# Patient Record
Sex: FEMALE | Race: ASIAN | NOT HISPANIC OR LATINO | URBAN - METROPOLITAN AREA
[De-identification: names, ages, dates, MRNs, and addresses within clinical notes are randomized per-mention and may not be internally consistent; named-entity substitution may affect disease eponyms.]

---

## 2017-01-27 ENCOUNTER — DOCUMENTATION ONLY (OUTPATIENT)
Dept: FAMILY MEDICINE | Facility: CLINIC | Age: 22
End: 2017-01-27

## 2017-01-27 ENCOUNTER — MEDICAL CORRESPONDENCE (OUTPATIENT)
Dept: HEALTH INFORMATION MANAGEMENT | Facility: CLINIC | Age: 22
End: 2017-01-27

## 2017-01-27 NOTE — PROGRESS NOTES
Consulted with Dr. Mathews to assist with creating a treatment plan after Dr. Mathews assessed this patient. I recommended making a referral to [facility] for ongoing mental health treatment.  Faxed referral form over to [facility]. Because the wait for psychiatry is typically quite long, recommend considering a PCT consult with Dr. Weeks to assist with assessment and treatment planning. Agatha is scheduled for follow-up with Dr. Mathews on 1/30/17, and they can discuss this option further at that visit.    Brit Ballard, PhD,   Behavioral Health Fellow

## 2017-01-27 NOTE — PROGRESS NOTES
"Patient seen today during her daughter's 4mo C (daughter is Yelena Rosales  2016). Patient has concerns for postpartum depression with psychotic features.     Subjective from today's visit: Mom is feeling down and depressed, mainly with stress about taking care of 4 children. This has been going on since about 2 weeks postpartum, but she thought it would go away and therefore elected not to tell anyone. She denies feeling anxious, but sometimes hears voices that aren't there. The voices tell her that she is lazy and not working. She denies any command hallucinations. She states that has been seeing a face and sometimes a whole body, in the mornings when she is still in bed. She sees it out of the corner of her eye, and then it goes away when she looks directly at them. She has had thoughts of hurting her kids but states that she doesn't hurt them because she doesn't want to hurt them. She just sometimes wants to be alone. She denies any active plan to hurt herself or her kids or others. Her  works during the day. She denies having any other family or other support system in the area. She reports having depressed mood \"sometimes\" in the past but has never been on medications for depression or been in therapy, or had a formal diagnosis of depression. She would be willing to consider a med.     PHQ-9 of 10. Had her sign a form to help get her set up with [facility], and will give to Dr. Ballard. She has a f/u appt already scheduled with me on 17 and I strongly encouraged her to attend.     Bernadette Mathews MD  "

## 2017-01-28 ASSESSMENT — PATIENT HEALTH QUESTIONNAIRE - PHQ9: SUM OF ALL RESPONSES TO PHQ QUESTIONS 1-9: 10

## 2017-01-30 ENCOUNTER — DOCUMENTATION ONLY (OUTPATIENT)
Dept: FAMILY MEDICINE | Facility: CLINIC | Age: 22
End: 2017-01-30

## 2017-01-30 ENCOUNTER — OFFICE VISIT (OUTPATIENT)
Dept: FAMILY MEDICINE | Facility: CLINIC | Age: 22
End: 2017-01-30

## 2017-01-30 VITALS
WEIGHT: 98.8 LBS | SYSTOLIC BLOOD PRESSURE: 98 MMHG | HEART RATE: 90 BPM | TEMPERATURE: 98.5 F | BODY MASS INDEX: 20.65 KG/M2 | DIASTOLIC BLOOD PRESSURE: 66 MMHG

## 2017-01-30 RX ORDER — SERTRALINE HYDROCHLORIDE 25 MG/1
25 TABLET, FILM COATED ORAL DAILY
Qty: 30 TABLET | Refills: 0 | Status: SHIPPED | OUTPATIENT
Start: 2017-01-30 | End: 2017-05-25

## 2017-01-30 NOTE — MR AVS SNAPSHOT
After Visit Summary   1/30/2017    Agatha Rosales    MRN: [id number]           Patient Information     Date Of Birth          1995        Visit Information        Provider Department      1/30/2017 11:20 AM Bernadette Mathews MD [facility]        Today's Diagnoses     Postpartum depression    -  1       Care Instructions    Start Zoloft 1 tab daily    Return in 1 week for follow-up    If your thoughts about self harm increase or if you just need additional support and care, here are some resources for you:    Crisis Lines:    [facility] Adult Crisis:  [phone]  Crisis Connection:  [phone]  [facility] Multilingual Crisis Line:  [phone]    You can also consider going to the [facility] at the following address.  Walk ins are welcome:    [address] [place] [address]   [phone] (for 24 hour crisis consultation)    Monday - Friday 8:00am - 7:00pm  Saturday:  11:00am - 3:00pm  Sunday and Holidays Closed    If you feel at risk of immediate harm, go directly to the Emergency Department.          Follow-ups after your visit        Additional Services     MENTAL HEALTH REFERRAL       Use this form for in clinic and community psychiatry and behavioral health consults. The referral coordinator will help to determine whether patients are best served by clinic behavioral health staff or by community providers.    Reason for referral: 23yo female with likely postpartum depression, possible psychosis. Would like referral to pyschiatry for diagnostic clarification and med management recs. Could we have her see Dr. Weeks?    Please provide data for below screening tools if available.   PHQ-9 Score:   Bipolar Screen:   SHIVA & Score:  PC-PTSD Score:   MMSE:   Heath (ADHD):   MCHAT (Autism Screen):  Pediatric Symptom Checklist (PSC):   Other Screening measures used:     Type of referral requested (indicate all that apply):    Adult Psychiatry--for  diagnosis and medication management     needed: Yes  Language: Perri  ([place] Only) Referral should be tracked (Yes/No)?                  Future tests that were ordered for you today     Open Future Orders        Priority Expected Expires Ordered    MENTAL HEALTH REFERRAL Routine  2018            Who to contact     Please call your clinic at [phone] to:    Ask questions about your health    Make or cancel appointments    Discuss your medicines    Learn about your test results    Speak to your doctor   If you have compliments or concerns about an experience at your clinic, or if you wish to file a complaint, please contact [facility] Patient Relations at [phone] or email us at [email or web address]         Additional Information About Your Visit        The ADEX Information     The ADEX is an electronic gateway that provides easy, online access to your medical records. With The ADEX, you can request a clinic appointment, read your test results, renew a prescription or communicate with your care team.     To sign up for The ADEX visit the website at [email or web address]   You will be asked to enter the access code listed below, as well as some personal information. Please follow the directions to create your username and password.     Your access code is: [email or web address]  Expires: 3/8/2017  8:40 AM     Your access code will  in 90 days. If you need help or a new code, please contact your [facility] or call [phone] for assistance.        Care EveryWhere ID     This is your Care EveryWhere ID. This could be used by other organizations to access your [place] medical records  [id number]        Your Vitals Were     Pulse Temperature Last Period Breastfeeding?          90 98.5  F (36.9  C) (Oral) 2017 (Within Months) Yes         Blood Pressure from Last 3 Encounters:   17 98/66   16 100/62    12/08/16 103/66    Weight from Last 3 Encounters:   01/30/17 98 lb 12.8 oz (44.815 kg)   12/19/16 101 lb 12.8 oz (46.176 kg)   12/08/16 101 lb 6.4 oz (45.995 kg)                 Today's Medication Changes          These changes are accurate as of: 1/30/17 12:14 PM.  If you have any questions, ask your nurse or doctor.               Start taking these medicines.        Dose/Directions    sertraline 25 MG tablet   Commonly known as:  ZOLOFT   Used for:  Postpartum depression   Started by:  Bernadette Mathews MD        Dose:  25 mg   Take 1 tablet (25 mg) by mouth daily   Quantity:  30 tablet   Refills:  0            Where to get your medicines      These medications were sent to [facility] - [place], [place] - [address], [place] [address]     Phone:  [phone]    - sertraline 25 MG tablet             Primary Care Provider Office Phone # Fax #    Bernadette Mathews [phone]       [address] [address]        Thank you!     Thank you for choosing [facility]  for your care. Our goal is always to provide you with excellent care. Hearing back from our patients is one way we can continue to improve our services. Please take a few minutes to complete the written survey that you may receive in the mail after your visit with us. Thank you!             Your Updated Medication List - Protect others around you: Learn how to safely use, store and throw away your medicines at www.disposemymeds.org.          This list is accurate as of: 1/30/17 12:14 PM.  Always use your most recent med list.                   Brand Name Dispense Instructions for use    acetaminophen 325 MG tablet    TYLENOL    100 tablet    Take 1-2 tablets (325-650 mg) by mouth every 6 hours as needed for headaches       calcium carbonate 500 MG chewable tablet    TUMS    150 tablet    Take 1 tablet (500 mg) by mouth 3 times daily as needed for heartburn       etonogestrel  68 MG Impl    IMPLANON/NEXPLANON     1 each (68 mg) by Subdermal route continuous       hydrocortisone 1 % cream    CORTAID    30 g    Apply sparingly to affected area three times daily for 14 days.       ibuprofen 600 MG tablet    ADVIL/MOTRIN    30 tablet    Take 1 tablet (600 mg) by mouth every 6 hours as needed for moderate pain       lanolin ointment     28 g    Apply topically daily as needed for dry skin       prenatal multivitamin  plus iron 27-0.8 MG Tabs per tablet     100 tablet    Take 1 tablet by mouth daily       sertraline 25 MG tablet    ZOLOFT    30 tablet    Take 1 tablet (25 mg) by mouth daily

## 2017-01-30 NOTE — NURSING NOTE
name: Herminio  Language: Perri  Agency: [facility]  Phone number: [phone]

## 2017-01-30 NOTE — PROGRESS NOTES
Hello Team [place]  Please review chart notes to evaluate for in house therapy.  Thank you.  Kayla Shannon  1/30/17

## 2017-01-30 NOTE — PROGRESS NOTES
Preceptor attestation:  Patient seen and discussed with the resident.  Assessment and plan reviewed with resident and agreed upon.  Supervising physician: Ramesh Randall  [facility]
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
## 2017-01-30 NOTE — PATIENT INSTRUCTIONS
Start Zoloft 1 tab daily    Return in 1 week for follow-up    If your thoughts about self harm increase or if you just need additional support and care, here are some resources for you:    Crisis Lines:    [facility] Adult Crisis:  [phone]  Crisis Connection:  [phone]  [facility] Multilingual Crisis Line:  [phone]    You can also consider going to the [facility] at the following address.  Walk ins are welcome:    [address] [place] [address]   [phone] (for 24 hour crisis consultation)    Monday - Friday 8:00am - 7:00pm  Saturday:  11:00am - 3:00pm  Sunday and Holidays Closed    If you feel at risk of immediate harm, go directly to the Emergency Department.    MENTAL HEALTH REFERRAL  Message routed to Team [place] to evaluate for in house therapy.  Kayla Pack  1-30/17

## 2017-01-31 ASSESSMENT — PATIENT HEALTH QUESTIONNAIRE - PHQ9: SUM OF ALL RESPONSES TO PHQ QUESTIONS 1-9: 15

## 2017-01-31 NOTE — PROGRESS NOTES
I have reviewed and agree with the behavioral health fellow's documentation for this visit.  I did not personally see the patient.  See documentation only encounter with referral from 1/30/17 for additional detail on follow up.  Thanks!  Lily Storey, PhD., LP

## 2017-01-31 NOTE — PROGRESS NOTES
Looks like Dr. Ballard has already facilitated a referral to [facility] for therapy as of 1/27/17.  The first PCT slot with Dr. Weeks is not until April.  Will ask Dr. Mathews for input on setting up PCT visit with Dr. Weeks vs. Referral to community psychiatry.  Thanks!  Lily      Expand All Collapse All    Consulted with Dr. Mathews to assist with creating a treatment plan after Dr. Mathews assessed this patient. I recommended making a referral to [facility] for ongoing mental health treatment.  Faxed referral form over to [facility]. Because the wait for psychiatry is typically quite long, recommend considering a PCT consult with Dr. Weeks to assist with assessment and treatment planning. Agatha is scheduled for follow-up with Dr. Mathews on 1/30/17, and they can discuss this option further at that visit.    Brit Ballard, PhD,   Behavioral Health Fellow

## 2017-02-15 ENCOUNTER — OFFICE VISIT - HEALTHEAST (OUTPATIENT)
Dept: FAMILY MEDICINE | Facility: CLINIC | Age: 22
End: 2017-02-15

## 2017-02-15 DIAGNOSIS — Z28.39 IMMUNIZATION DEFICIENCY: ICD-10-CM

## 2017-02-15 ASSESSMENT — MIFFLIN-ST. JEOR: SCORE: 1073.25

## 2017-04-19 ENCOUNTER — MEDICAL CORRESPONDENCE (OUTPATIENT)
Dept: HEALTH INFORMATION MANAGEMENT | Facility: CLINIC | Age: 22
End: 2017-04-19

## 2017-04-21 ENCOUNTER — OFFICE VISIT (OUTPATIENT)
Dept: FAMILY MEDICINE | Facility: CLINIC | Age: 22
End: 2017-04-21

## 2017-04-21 VITALS
HEART RATE: 81 BPM | OXYGEN SATURATION: 99 % | SYSTOLIC BLOOD PRESSURE: 101 MMHG | TEMPERATURE: 98.2 F | DIASTOLIC BLOOD PRESSURE: 70 MMHG

## 2017-04-21 DIAGNOSIS — G47.00 PERSISTENT INSOMNIA: ICD-10-CM

## 2017-04-21 DIAGNOSIS — Z60.3 LANGUAGE BARRIER, CULTURAL DIFFERENCES: ICD-10-CM

## 2017-04-21 RX ORDER — HYDROXYZINE PAMOATE 25 MG/1
25 CAPSULE ORAL
Qty: 20 CAPSULE | Refills: 0 | Status: SHIPPED | OUTPATIENT
Start: 2017-04-21 | End: 2017-05-25

## 2017-04-21 SDOH — SOCIAL STABILITY - SOCIAL INSECURITY: ACCULTURATION DIFFICULTY: Z60.3

## 2017-04-21 NOTE — MR AVS SNAPSHOT
After Visit Summary   2017    Agatha Rosales    MRN: [id number]           Patient Information     Date Of Birth          1995        Visit Information        Provider Department      2017 4:30 PM Benji Ross MD [facility]        Today's Diagnoses     Postpartum depression    -  1    Language barrier, cultural differences        Persistent insomnia           Follow-ups after your visit        Who to contact     Please call your clinic at [phone] to:    Ask questions about your health    Make or cancel appointments    Discuss your medicines    Learn about your test results    Speak to your doctor   If you have compliments or concerns about an experience at your clinic, or if you wish to file a complaint, please contact [facility] Patient Relations at [phone] or email us at [email or web address]         Additional Information About Your Visit        MyChart Information     [employer] is an electronic gateway that provides easy, online access to your medical records. With [employer], you can request a clinic appointment, read your test results, renew a prescription or communicate with your care team.     To sign up for Celtrot visit the website at [email or web address]   You will be asked to enter the access code listed below, as well as some personal information. Please follow the directions to create your username and password.     Your access code is: [email or web address]  Expires: 2017  5:08 PM     Your access code will  in 90 days. If you need help or a new code, please contact your [facility] or call [phone] for assistance.        Care EveryWhere ID     This is your Care EveryWhere ID. This could be used by other organizations to access your [place] medical records  [id number]        Your Vitals Were     Pulse Temperature Pulse Oximetry             81 98.2  F (36.8  C) (Oral) 99%           Blood Pressure from Last 3 Encounters:   04/21/17 101/70   01/30/17 98/66   12/19/16 100/62    Weight from Last 3 Encounters:   01/30/17 98 lb 12.8 oz (44.8 kg)   12/19/16 101 lb 12.8 oz (46.2 kg)   12/08/16 101 lb 6.4 oz (46 kg)              Today, you had the following     No orders found for display         Today's Medication Changes          These changes are accurate as of: 4/21/17  5:08 PM.  If you have any questions, ask your nurse or doctor.               Start taking these medicines.        Dose/Directions    hydrOXYzine 25 MG capsule   Commonly known as:  VISTARIL   Used for:  Persistent insomnia   Started by:  Benji Ross MD        Dose:  25 mg   Take 1 capsule (25 mg) by mouth nightly as needed for itching   Quantity:  20 capsule   Refills:  0            Where to get your medicines      These medications were sent to [facility] - [place], [place] - [address], [place] [address]     Phone:  [phone]     hydrOXYzine 25 MG capsule                Primary Care Provider Office Phone # Fax #    Bernadette Mathews [phone]       [address] [address]        Thank you!     Thank you for choosing [facility]  for your care. Our goal is always to provide you with excellent care. Hearing back from our patients is one way we can continue to improve our services. Please take a few minutes to complete the written survey that you may receive in the mail after your visit with us. Thank you!             Your Updated Medication List - Protect others around you: Learn how to safely use, store and throw away your medicines at www.disposemymeds.org.          This list is accurate as of: 4/21/17  5:08 PM.  Always use your most recent med list.                   Brand Name Dispense Instructions for use    acetaminophen 325 MG tablet    TYLENOL    100 tablet    Take 1-2 tablets (325-650 mg) by mouth every 6 hours as needed for headaches        calcium carbonate 500 MG chewable tablet    TUMS    150 tablet    Take 1 tablet (500 mg) by mouth 3 times daily as needed for heartburn       etonogestrel 68 MG Impl    IMPLANON/NEXPLANON     1 each (68 mg) by Subdermal route continuous       hydrocortisone 1 % cream    CORTAID    30 g    Apply sparingly to affected area three times daily for 14 days.       hydrOXYzine 25 MG capsule    VISTARIL    20 capsule    Take 1 capsule (25 mg) by mouth nightly as needed for itching       ibuprofen 600 MG tablet    ADVIL/MOTRIN    30 tablet    Take 1 tablet (600 mg) by mouth every 6 hours as needed for moderate pain       lanolin ointment     28 g    Apply topically daily as needed for dry skin       prenatal multivitamin  plus iron 27-0.8 MG Tabs per tablet     100 tablet    Take 1 tablet by mouth daily       sertraline 25 MG tablet    ZOLOFT    30 tablet    Take 1 tablet (25 mg) by mouth daily

## 2017-04-22 ASSESSMENT — PATIENT HEALTH QUESTIONNAIRE - PHQ9: SUM OF ALL RESPONSES TO PHQ QUESTIONS 1-9: 20

## 2017-04-25 PROBLEM — Z63.8 HIGH LEVEL OF EXPRESSED EMOTION WITHIN FAMILY: Status: ACTIVE | Noted: 2017-04-25

## 2017-04-25 PROBLEM — F32.2 SEVERE MAJOR DEPRESSIVE DISORDER (H): Status: ACTIVE | Noted: 2017-04-25

## 2017-04-25 PROBLEM — Z91.49: Status: ACTIVE | Noted: 2017-04-25
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
## 2017-04-28 ENCOUNTER — OFFICE VISIT (OUTPATIENT)
Dept: FAMILY MEDICINE | Facility: CLINIC | Age: 22
End: 2017-04-28

## 2017-04-28 VITALS
TEMPERATURE: 98.3 F | DIASTOLIC BLOOD PRESSURE: 65 MMHG | SYSTOLIC BLOOD PRESSURE: 99 MMHG | WEIGHT: 96.25 LBS | OXYGEN SATURATION: 99 % | BODY MASS INDEX: 20.12 KG/M2 | HEART RATE: 84 BPM

## 2017-04-28 DIAGNOSIS — Z60.3 LANGUAGE BARRIER, CULTURAL DIFFERENCES: ICD-10-CM

## 2017-04-28 DIAGNOSIS — G47.00 PERSISTENT INSOMNIA: ICD-10-CM

## 2017-04-28 SDOH — SOCIAL STABILITY - SOCIAL INSECURITY: ACCULTURATION DIFFICULTY: Z60.3

## 2017-04-28 NOTE — MR AVS SNAPSHOT
After Visit Summary   2017    Agatha Rosales    MRN: [id number]           Patient Information     Date Of Birth          1995        Visit Information        Provider Department      2017 4:10 PM Benji Ross MD [facility]        Care Instructions    Ongoing Sleep difficulties.  Low appetite.  Low mood.    hydroxyzine not very helpful (25mg).   Has been itchy some.    1) Follow up at [place], as scheduled May 9th    2) Could still take 1-2 hydroxyzine at bedtime IF NEEDED.        Follow-ups after your visit        Who to contact     Please call your clinic at [phone] to:    Ask questions about your health    Make or cancel appointments    Discuss your medicines    Learn about your test results    Speak to your doctor   If you have compliments or concerns about an experience at your clinic, or if you wish to file a complaint, please contact [facility] Patient Relations at [phone] or email us at [email or web address]         Additional Information About Your Visit        MyChart Information     Lockstream is an electronic gateway that provides easy, online access to your medical records. With Lockstream, you can request a clinic appointment, read your test results, renew a prescription or communicate with your care team.     To sign up for Lockstream visit the website at [email or web address]   You will be asked to enter the access code listed below, as well as some personal information. Please follow the directions to create your username and password.     Your access code is: [email or web address]  Expires: 2017  5:08 PM     Your access code will  in 90 days. If you need help or a new code, please contact your [facility] or call [phone] for assistance.        Care EveryWhere ID     This is your Care EveryWhere ID. This could be used by other organizations to access your [facility]  records  [id number]        Your Vitals Were     Pulse Temperature Last Period Pulse Oximetry Breastfeeding? BMI (Body Mass Index)    84 98.3  F (36.8  C) (Oral) (LMP Unknown) 99% Yes 20.12 kg/m2       Blood Pressure from Last 3 Encounters:   04/28/17 99/65   04/21/17 101/70   01/30/17 98/66    Weight from Last 3 Encounters:   04/28/17 96 lb 4 oz (43.7 kg)   01/30/17 98 lb 12.8 oz (44.8 kg)   12/19/16 101 lb 12.8 oz (46.2 kg)              Today, you had the following     No orders found for display       Primary Care Provider Office Phone # Fax #    Bernadette Mathews [phone]       [address] [address]        Thank you!     Thank you for choosing [facility]  for your care. Our goal is always to provide you with excellent care. Hearing back from our patients is one way we can continue to improve our services. Please take a few minutes to complete the written survey that you may receive in the mail after your visit with us. Thank you!             Your Updated Medication List - Protect others around you: Learn how to safely use, store and throw away your medicines at www.disposemymeds.org.          This list is accurate as of: 4/28/17  4:43 PM.  Always use your most recent med list.                   Brand Name Dispense Instructions for use    acetaminophen 325 MG tablet    TYLENOL    100 tablet    Take 1-2 tablets (325-650 mg) by mouth every 6 hours as needed for headaches       etonogestrel 68 MG Impl    IMPLANON/NEXPLANON     1 each (68 mg) by Subdermal route continuous       hydrOXYzine 25 MG capsule    VISTARIL    20 capsule    Take 1 capsule (25 mg) by mouth nightly as needed for itching       sertraline 25 MG tablet    ZOLOFT    30 tablet    Take 1 tablet (25 mg) by mouth daily
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
## 2017-04-28 NOTE — PATIENT INSTRUCTIONS
Ongoing Sleep difficulties.  Low appetite.  Low mood.    hydroxyzine not very helpful (25mg).   Has been itchy some.    1) Follow up at [place], as scheduled May 9th    2) Could still take 1-2 hydroxyzine at bedtime IF NEEDED.

## 2017-04-28 NOTE — NURSING NOTE
name: Paresh Chavez  Language: Perri  Agency: [facility]  Phone number: [phone]

## 2017-04-29 ASSESSMENT — PATIENT HEALTH QUESTIONNAIRE - PHQ9: SUM OF ALL RESPONSES TO PHQ QUESTIONS 1-9: 17

## 2017-05-25 ENCOUNTER — OFFICE VISIT (OUTPATIENT)
Dept: FAMILY MEDICINE | Facility: CLINIC | Age: 22
End: 2017-05-25

## 2017-05-25 VITALS
OXYGEN SATURATION: 98 % | BODY MASS INDEX: 20.48 KG/M2 | WEIGHT: 98 LBS | SYSTOLIC BLOOD PRESSURE: 96 MMHG | TEMPERATURE: 98.1 F | HEART RATE: 82 BPM | DIASTOLIC BLOOD PRESSURE: 62 MMHG

## 2017-05-25 DIAGNOSIS — G47.00 PERSISTENT INSOMNIA: ICD-10-CM

## 2017-05-25 RX ORDER — SERTRALINE HYDROCHLORIDE 25 MG/1
TABLET, FILM COATED ORAL
Qty: 60 TABLET | Refills: 0 | Status: SHIPPED | OUTPATIENT
Start: 2017-05-25 | End: 2017-06-06

## 2017-05-25 RX ORDER — HYDROXYZINE PAMOATE 25 MG/1
25-50 CAPSULE ORAL
Qty: 60 CAPSULE | Refills: 0 | Status: SHIPPED | OUTPATIENT
Start: 2017-05-25 | End: 2017-06-20

## 2017-05-25 NOTE — PATIENT INSTRUCTIONS
- Crisis Connection: [phone] (crisis counseling)  - [facility] Suicide Hotline: 983.391.1707 (suicidal thoughts)  - [facility]: [phone] (psychiatric crisis, but can transport self)  - [facility]: [phone] (psychiatric crisis, but cannot transport self)  - [facility] Adult Crisis Line: [phone] (crisis counseling and walk-in urgent care mental health)  - [facility] Multilingual Crisis Line: [phone]  -Suicide Prevention Lifeline Phone: 2-135-842-NSYV (3410)  -If in immediate danger of harming self/others, call 9-1-1.
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
## 2017-05-25 NOTE — MR AVS SNAPSHOT
After Visit Summary   5/25/2017    Agatha Rosales    MRN: [id number]           Patient Information     Date Of Birth          1995        Visit Information        Provider Department      5/25/2017 2:50 PM Bernadette Mathews MD [facility]        Today's Diagnoses     Postpartum depression        Persistent insomnia          Care Instructions      - Crisis Connection: [phone] (crisis counseling)  - [facility] Suicide Hotline: 571.379.9888 (suicidal thoughts)  - [facility]: [phone] (psychiatric crisis, but can transport self)  - [facility]: [phone] (psychiatric crisis, but cannot transport self)  - [facility] Adult Crisis Line: [phone] (crisis counseling and walk-in urgent care mental health)  - [facility] Multilingual Crisis Line: [phone]  -Suicide Prevention Lifeline Phone: 5-686-435-TALK (2101)  -If in immediate danger of harming self/others, call 9-1-1.              Follow-ups after your visit        Who to contact     Please call your clinic at [phone] to:    Ask questions about your health    Make or cancel appointments    Discuss your medicines    Learn about your test results    Speak to your doctor   If you have compliments or concerns about an experience at your clinic, or if you wish to file a complaint, please contact [facility] Patient Relations at [phone] or email us at [email or web address]         Additional Information About Your Visit        MyChart Information     Haload is an electronic gateway that provides easy, online access to your medical records. With Haload, you can request a clinic appointment, read your test results, renew a prescription or communicate with your care team.     To sign up for DeliveryEdget visit the website at [email or web address]   You will be asked to enter the access code listed below, as well as some personal information. Please follow the directions to create your username  and password.     Your access code is: [email or web address]  Expires: 2017  5:08 PM     Your access code will  in 90 days. If you need help or a new code, please contact your [facility] or call [phone] for assistance.        Care EveryWhere ID     This is your Care EveryWhere ID. This could be used by other organizations to access your [place] medical records  [id number]        Your Vitals Were     Pulse Temperature Last Period Pulse Oximetry BMI (Body Mass Index)       82 98.1  F (36.7  C) (Oral) (LMP Unknown) 98% 20.48 kg/m2        Blood Pressure from Last 3 Encounters:   17 96/62   17 99/65   17 101/70    Weight from Last 3 Encounters:   17 98 lb (44.5 kg)   17 96 lb 4 oz (43.7 kg)   17 98 lb 12.8 oz (44.8 kg)              Today, you had the following     No orders found for display         Today's Medication Changes          These changes are accurate as of: 17  3:12 PM.  If you have any questions, ask your nurse or doctor.               These medicines have changed or have updated prescriptions.        Dose/Directions    hydrOXYzine 25 MG capsule   Commonly known as:  VISTARIL   This may have changed:    - how much to take  - reasons to take this   Used for:  Persistent insomnia   Changed by:  Bernadette Mathews MD        Dose:  25-50 mg   Take 1-2 capsules (25-50 mg) by mouth nightly as needed for other (sleep)   Quantity:  60 capsule   Refills:  0       sertraline 25 MG tablet   Commonly known as:  ZOLOFT   This may have changed:    - how much to take  - how to take this  - when to take this  - additional instructions   Used for:  Postpartum depression   Changed by:  Bernadette Mathews MD        Start with 1 tab (25 mg) daily for 1 week. Then increase to 2 tabs (50mg) daily.   Quantity:  60 tablet   Refills:  0            Where to get your medicines      These medications were sent to [facility] - [place], [place] - [facility] [address]  [address], [place] [address]     Phone:  [phone]     hydrOXYzine 25 MG capsule    sertraline 25 MG tablet                Primary Care Provider Office Phone # Fax #    Bernadette Mathews [phone]       [address] [address]        Thank you!     Thank you for choosing [facility]  for your care. Our goal is always to provide you with excellent care. Hearing back from our patients is one way we can continue to improve our services. Please take a few minutes to complete the written survey that you may receive in the mail after your visit with us. Thank you!             Your Updated Medication List - Protect others around you: Learn how to safely use, store and throw away your medicines at www.disposemymeds.org.          This list is accurate as of: 5/25/17  3:12 PM.  Always use your most recent med list.                   Brand Name Dispense Instructions for use    acetaminophen 325 MG tablet    TYLENOL    100 tablet    Take 1-2 tablets (325-650 mg) by mouth every 6 hours as needed for headaches       etonogestrel 68 MG Impl    IMPLANON/NEXPLANON     1 each (68 mg) by Subdermal route continuous       hydrOXYzine 25 MG capsule    VISTARIL    60 capsule    Take 1-2 capsules (25-50 mg) by mouth nightly as needed for other (sleep)       sertraline 25 MG tablet    ZOLOFT    60 tablet    Start with 1 tab (25 mg) daily for 1 week. Then increase to 2 tabs (50mg) daily.

## 2017-05-25 NOTE — PROGRESS NOTES
Preceptor attestation:  Patient seen and discussed with the resident. Assessment and plan reviewed with resident and agreed upon.  Supervising physician: Benji Ross  [facility]

## 2017-05-26 ASSESSMENT — PATIENT HEALTH QUESTIONNAIRE - PHQ9: SUM OF ALL RESPONSES TO PHQ QUESTIONS 1-9: 18

## 2017-06-06 ENCOUNTER — OFFICE VISIT (OUTPATIENT)
Dept: FAMILY MEDICINE | Facility: CLINIC | Age: 22
End: 2017-06-06

## 2017-06-06 ENCOUNTER — DOCUMENTATION ONLY (OUTPATIENT)
Dept: PSYCHOLOGY | Facility: CLINIC | Age: 22
End: 2017-06-06

## 2017-06-06 VITALS
SYSTOLIC BLOOD PRESSURE: 91 MMHG | HEART RATE: 79 BPM | DIASTOLIC BLOOD PRESSURE: 55 MMHG | BODY MASS INDEX: 20.57 KG/M2 | TEMPERATURE: 98.2 F | WEIGHT: 98.4 LBS

## 2017-06-06 DIAGNOSIS — Z23 NEED FOR VACCINATION: ICD-10-CM

## 2017-06-06 RX ORDER — SERTRALINE HYDROCHLORIDE 25 MG/1
75 TABLET, FILM COATED ORAL DAILY
Qty: 90 TABLET | Refills: 3 | Status: SHIPPED | OUTPATIENT
Start: 2017-06-06 | End: 2017-07-12

## 2017-06-06 NOTE — MR AVS SNAPSHOT
After Visit Summary   6/6/2017    Agatha Rosales    MRN: [id number]           Patient Information     Date Of Birth          1995        Visit Information        Provider Department      6/6/2017 10:20 AM Bernadette Mathews MD [facility]        Today's Diagnoses     Postpartum depression          Care Instructions    Take Zoloft 3 tabs (75mg) daily    Clinic will call to discuss therapy plan    Return in 2 weeks to follow-up on mood          Follow-ups after your visit        Additional Services     MENTAL HEALTH REFERRAL       Use this form for in clinic and community psychiatry and behavioral health consults. The referral coordinator will help to determine whether patients are best served by clinic behavioral health staff or by community providers.    Reason for referral: 21yo female with postpartum depression, interested in psychotherapy, would really prefer therapy here as it is difficult to get to [facility], understands she may have a long wait time    Please provide data for below screening tools if available.   PHQ-9 Score:   Bipolar Screen:   SHIVA & Score:  PC-PTSD Score:   MMSE:   [place] (ADHD):   MCHAT (Autism Screen):  Pediatric Symptom Checklist (PSC):   Other Screening measures used:     Type of referral requested (indicate all that apply):    Adult Psychotherapy--for diagnosis and non-pharmacological treatment     needed: Yes  Language: Perri  ([place] Only) Referral should be tracked (Yes/No)?                  Future tests that were ordered for you today     Open Future Orders        Priority Expected Expires Ordered    MENTAL HEALTH REFERRAL Routine  6/6/2018 6/6/2017            Who to contact     Please call your clinic at [phone] to:    Ask questions about your health    Make or cancel appointments    Discuss your medicines    Learn about your test results    Speak to your doctor   If you have compliments or concerns about an experience at your clinic, or if you  wish to file a complaint, please contact [facility] Patient Relations at [phone] or email us at [email or web address]         Additional Information About Your Visit        [employer] Information     [employer] is an electronic gateway that provides easy, online access to your medical records. With [employer], you can request a clinic appointment, read your test results, renew a prescription or communicate with your care team.     To sign up for [employer] visit the website at [email or web address]   You will be asked to enter the access code listed below, as well as some personal information. Please follow the directions to create your username and password.     Your access code is: [email or web address]  Expires: 2017  5:08 PM     Your access code will  in 90 days. If you need help or a new code, please contact your [facility] or call [phone] for assistance.        Care EveryWhere ID     This is your Care EveryWhere ID. This could be used by other organizations to access your [place] medical records  [id number]        Your Vitals Were     Pulse Temperature BMI (Body Mass Index)             79 98.2  F (36.8  C) (Oral) 20.57 kg/m2          Blood Pressure from Last 3 Encounters:   17 91/55   17 96/62   17 99/65    Weight from Last 3 Encounters:   17 98 lb 6.4 oz (44.6 kg)   17 98 lb (44.5 kg)   17 96 lb 4 oz (43.7 kg)                 Today's Medication Changes          These changes are accurate as of: 17 11:06 AM.  If you have any questions, ask your nurse or doctor.               These medicines have changed or have updated prescriptions.        Dose/Directions    sertraline 25 MG tablet   Commonly known as:  ZOLOFT   This may have changed:    - how much to take  - how to take this  - when to take this  - additional instructions   Used for:  Postpartum depression   Changed by:  Bernadette Mathews MD         Dose:  75 mg   Take 3 tablets (75 mg) by mouth daily   Quantity:  90 tablet   Refills:  3            Where to get your medicines      These medications were sent to [facility] - [place], [place] - [address], [place] [address]     Phone:  [phone]     sertraline 25 MG tablet                Primary Care Provider Office Phone # Fax #    Bernadette Mathews [phone]       [facility] [address] [address]        Thank you!     Thank you for choosing [facility]  for your care. Our goal is always to provide you with excellent care. Hearing back from our patients is one way we can continue to improve our services. Please take a few minutes to complete the written survey that you may receive in the mail after your visit with us. Thank you!             Your Updated Medication List - Protect others around you: Learn how to safely use, store and throw away your medicines at www.disposemymeds.org.          This list is accurate as of: 6/6/17 11:06 AM.  Always use your most recent med list.                   Brand Name Dispense Instructions for use    acetaminophen 325 MG tablet    TYLENOL    100 tablet    Take 1-2 tablets (325-650 mg) by mouth every 6 hours as needed for headaches       etonogestrel 68 MG Impl    IMPLANON/NEXPLANON     1 each (68 mg) by Subdermal route continuous       hydrOXYzine 25 MG capsule    VISTARIL    60 capsule    Take 1-2 capsules (25-50 mg) by mouth nightly as needed for other (sleep)       sertraline 25 MG tablet    ZOLOFT    90 tablet    Take 3 tablets (75 mg) by mouth daily

## 2017-06-06 NOTE — PATIENT INSTRUCTIONS
Take Zoloft 3 tabs (75mg) daily    Clinic will call to discuss therapy plan    Return in 2 weeks to follow-up on mood    Message has been sent to  team to advise.  Shruthi  06/06/17    See documentation encounter for details on mental health referral.    Dr. Ballard on 6/23/17.   An  has been requested as well as a ride scheduled to pick her up.   Shruthi  06/09/17

## 2017-06-06 NOTE — PROGRESS NOTES
Behavioral Health Team,    Patient is being referred for mental health services. Please advise if we are able to see patient for in house treatment or if a community option would be best.    Thank you.     Shruthi  Referral Coordinator

## 2017-06-06 NOTE — PROGRESS NOTES
Preceptor attestation:  Patient seen and discussed with the resident. Assessment and plan reviewed with resident and agreed upon.  Supervising physician: Joel Gupta  [facility]

## 2017-06-06 NOTE — PROGRESS NOTES
SUBJECTIVE       Agatha Rosales is a 22 year old  female with a PMH significant for:     Patient Active Problem List   Diagnosis     Screening for depression     Language barrier, cultural differences     Tension headache     History of Helicobacter pylori infection     Bilateral low back pain without sciatica     Dermatitis     Severe major depressive disorder (H)     Other personal history of psychological trauma, not elsewhere classified     High level of expressed emotion within family     She presents to follow-up on her depression. Was seen about 6 months postpartum after the delivery of her child, and diagnosed with postpartum depression. Was trialed on a low dose of Zoloft 25mg daily but patient felt it wasn't helping and self-stopped it. Was seen again in follow-up about 2 weeks ago. She was agreeable to restarting the Zoloft and titrating up the dose (initially on 25 mg daily, and now on 50mg daily). She reports she is taking this 2 tabs daily (50mg total). She also takes the hydroxyzine 25mg daily. She has found improvement in her mood and sleep since starting these meds.  She still notes some depressed mood intermittently. She has noticed no side effects from the medications and is tolerating them. She would like to increase the Zoloft to help her mood further. She also is interested in psychotherapy. Was seen at [facility] but she tells me transportation is difficult for her to go there. She would prefer psychotherapy here at clinic. PHQ-9 of 11 today, improved from 18 last visit. No SI.       PMH, Medications and Allergies were reviewed and updated as needed.        REVIEW OF SYSTEMS     Pertinent positives and negatives noted in HPI.         OBJECTIVE     Vitals:    06/06/17 1026   BP: 91/55   Pulse: 79   Temp: 98.2  F (36.8  C)   TempSrc: Oral   Weight: 98 lb 6.4 oz (44.6 kg)     Body mass index is 20.57 kg/(m^2).    General: Alert, NAD  Psych: Pleasant mood, flat affect, similar to previous  visits    No results found for this or any previous visit (from the past 24 hour(s)).        ASSESSMENT AND PLAN     Agatha was seen today for recheck.    Diagnoses and all orders for this visit:    Postpartum depression: Mood and sleep improved since restarting the Zoloft. Since noting improvement but still having some depressive symptoms, will increase the Zoloft to 75mg daily. Will see back in 2 weeks to make sure she is tolerating the dose. Will also place a new MH referral to see if it would be possible for her to do in-house psychotherapy, but I did warn pt that there may be a significant waiting period for this depending on scheduling.   -     sertraline (ZOLOFT) 25 MG tablet; Take 3 tablets (75 mg) by mouth daily  -     MENTAL HEALTH REFERRAL; Future    Need for vaccination: Pt agreeable to HPV vaccine #3 today.   -     ADMIN VACCINE, INITIAL  -     HPV9 (Gardasil 9 )          RTC in 2 weeks for follow up of depression or sooner if develops new or worsening symptoms.    Patient's plan of care was discussed with Dr. Gupta.    Bernadette Mathews MD PGY-3  Pager #: [phone]

## 2017-06-07 ASSESSMENT — PATIENT HEALTH QUESTIONNAIRE - PHQ9: SUM OF ALL RESPONSES TO PHQ QUESTIONS 1-9: 11

## 2017-06-09 NOTE — PROGRESS NOTES
Perfect, I have scheduled her with Dr. Ballard on 6/23/17.   An  has been requested as well as a ride scheduled to pick her up.     Shruthi  06/09/17

## 2017-06-09 NOTE — PROGRESS NOTES
Dr. 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  We can offer this patient an appt with Dr. Ballard, but should make her aware that Dr. Ballard will be with us only until early September.  If this is not agreeable to her, please provide the following community resources for her:    [facility]  [address]   [place], [place] [address]  [phone]    Lisy Arriaga PsyD, [phone]  Pregnancy & birth, postpartum & post-adoption issues  [facility], [address], [place] [address]    Jessica Casanova, PhD,  [phone]  Pregnancy & birth, adjustment to parenting, grief, trauma/EMDR, couples  [address] [place] [address]    Anika Elizabeth, PhD,  [phone]  Infertility, pregnancy & infant loss, postpartum, teaching/training  [facility], [address], [place] [address]    Merced Bailey, PhD, [phone]  Pregnancy & birth issues, postpartum, loss/trauma, mothering  [address], [place] [address]    Serene Viveros, PhD, , [phone]  Infertility, pregnancy, postpartum, grief & loss, trauma/EMDR, parenting, adoption  [address] [address]

## 2017-06-20 ENCOUNTER — OFFICE VISIT (OUTPATIENT)
Dept: FAMILY MEDICINE | Facility: CLINIC | Age: 22
End: 2017-06-20

## 2017-06-20 VITALS
WEIGHT: 95 LBS | HEART RATE: 86 BPM | DIASTOLIC BLOOD PRESSURE: 66 MMHG | BODY MASS INDEX: 19.94 KG/M2 | SYSTOLIC BLOOD PRESSURE: 100 MMHG | TEMPERATURE: 98.3 F | OXYGEN SATURATION: 100 % | HEIGHT: 58 IN

## 2017-06-20 DIAGNOSIS — G47.00 PERSISTENT INSOMNIA: ICD-10-CM

## 2017-06-20 DIAGNOSIS — R42 LIGHTHEADEDNESS: ICD-10-CM

## 2017-06-20 LAB
BUN SERPL-MCNC: 19.1 MG/DL (ref 7–19)
CALCIUM SERPL-MCNC: 9.6 MG/DL (ref 8.5–10.1)
CHLORIDE SERPLBLD-SCNC: 106.4 MMOL/L (ref 98–110)
CO2 SERPL-SCNC: 21.3 MMOL/L (ref 20–32)
CREAT SERPL-MCNC: 0.8 MG/DL (ref 0.5–1)
GFR SERPL CREATININE-BSD FRML MDRD: >90 ML/MIN/1.7 M2
GLUCOSE SERPL-MCNC: 98 MG'DL (ref 70–99)
HCT VFR BLD AUTO: 45.1 % (ref 35–47)
HEMOGLOBIN: 14.2 G/DL (ref 11.7–15.7)
MCH RBC QN AUTO: 28.5 PG (ref 26.5–35)
MCHC RBC AUTO-ENTMCNC: 31.5 G/DL (ref 32–36)
MCV RBC AUTO: 90.6 FL (ref 78–100)
PLATELET # BLD AUTO: 237 K/UL (ref 150–450)
POTASSIUM SERPL-SCNC: 3.8 MMOL/DL (ref 3.2–4.6)
RBC # BLD AUTO: 5 M/UL (ref 3.8–5.2)
SODIUM SERPL-SCNC: 140.3 MMOL/L (ref 132–142)
TSH SERPL DL<=0.05 MIU/L-ACNC: 1.65 UIU/ML (ref 0.3–5)
WBC # BLD AUTO: 8.4 K/UL (ref 4–11)

## 2017-06-20 RX ORDER — HYDROXYZINE PAMOATE 25 MG/1
25-50 CAPSULE ORAL
Qty: 60 CAPSULE | Refills: 3 | Status: SHIPPED | OUTPATIENT
Start: 2017-06-20 | End: 2017-08-25

## 2017-06-20 ASSESSMENT — PATIENT HEALTH QUESTIONNAIRE - PHQ9: 5. POOR APPETITE OR OVEREATING: NOT AT ALL

## 2017-06-20 ASSESSMENT — ANXIETY QUESTIONNAIRES
7. FEELING AFRAID AS IF SOMETHING AWFUL MIGHT HAPPEN: NOT AT ALL
2. NOT BEING ABLE TO STOP OR CONTROL WORRYING: NOT AT ALL
IF YOU CHECKED OFF ANY PROBLEMS ON THIS QUESTIONNAIRE, HOW DIFFICULT HAVE THESE PROBLEMS MADE IT FOR YOU TO DO YOUR WORK, TAKE CARE OF THINGS AT HOME, OR GET ALONG WITH OTHER PEOPLE: NOT DIFFICULT AT ALL
1. FEELING NERVOUS, ANXIOUS, OR ON EDGE: NOT AT ALL
3. WORRYING TOO MUCH ABOUT DIFFERENT THINGS: NOT AT ALL
6. BECOMING EASILY ANNOYED OR IRRITABLE: NOT AT ALL
GAD7 TOTAL SCORE: 0
5. BEING SO RESTLESS THAT IT IS HARD TO SIT STILL: NOT AT ALL

## 2017-06-20 NOTE — MR AVS SNAPSHOT
After Visit Summary   6/20/2017    Agatha Rosales    MRN: [id number]           Patient Information     Date Of Birth          1995        Visit Information        Provider Department      6/20/2017 10:00 AM Bernadette Mathews MD [facility]        Today's Diagnoses     Postpartum depression    -  1    Persistent insomnia        Lightheadedness           Follow-ups after your visit        Follow-up notes from your care team     Return in about 2 weeks (around 7/4/2017) for f/u mood.      Your next 10 appointments already scheduled     Jun 23, 2017  9:30 AM CDT   Return Visit with Brit Ballard PhD   [facility] ([facility])    [address] [place] [address]   [phone]            Jul 11, 2017  3:30 PM CDT   Return Visit with Prema Trotter MD   [facility] ([facility])    [address] [place] [address]   [phone]              Who to contact     Please call your clinic at [phone] to:    Ask questions about your health    Make or cancel appointments    Discuss your medicines    Learn about your test results    Speak to your doctor   If you have compliments or concerns about an experience at your clinic, or if you wish to file a complaint, please contact [facility] Patient Relations at [phone] or email us at [email or web address]         Additional Information About Your Visit        MyChart Information     [employer] is an electronic gateway that provides easy, online access to your medical records. With [employer], you can request a clinic appointment, read your test results, renew a prescription or communicate with your care team.     To sign up for [employer] visit the website at [email or web address]   You will be asked to enter the access code listed below, as well as some personal information. Please follow the directions to create your username and password.     Your access code is:  [email or web address]  Expires: 2017  5:08 PM     Your access code will  in 90 days. If you need help or a new code, please contact your [facility] or call [phone] for assistance.        Care EveryWhere ID     This is your Care EveryWhere ID. This could be used by other organizations to access your [place] medical records  [id number]        Your Vitals Were     Pulse Temperature Height Last Period Pulse Oximetry BMI (Body Mass Index)    86 98.3  F (36.8  C) 1.48 m (4' 10.27\") 2017 100% 19.67 kg/m2       Blood Pressure from Last 3 Encounters:   17 100/66   17 91/55   17 96/62    Weight from Last 3 Encounters:   17 43.1 kg (95 lb)   17 44.6 kg (98 lb 6.4 oz)   17 44.5 kg (98 lb)              We Performed the Following     Basic Metabolic Panel ([facility])  - Results < 1 hr     CBC with Plt ([facility])     TSH  [facility]ensitive ([facility])          Where to get your medicines      These medications were sent to [facility] - [place], [place] - [address], [place] [address]     Phone:  [phone]     hydrOXYzine 25 MG capsule          Primary Care Provider Office Phone # Fax #    Bernadette Mathews [phone]       [address] [address]        Thank you!     Thank you for choosing [facility]  for your care. Our goal is always to provide you with excellent care. Hearing back from our patients is one way we can continue to improve our services. Please take a few minutes to complete the written survey that you may receive in the mail after your visit with us. Thank you!             Your Updated Medication List - Protect others around you: Learn how to safely use, store and throw away your medicines at www.disposemymeds.org.          This list is accurate as of: 17 11:59 PM.  Always use your most recent med list.                   Brand Name Dispense Instructions " for use    acetaminophen 325 MG tablet    TYLENOL    100 tablet    Take 1-2 tablets (325-650 mg) by mouth every 6 hours as needed for headaches       etonogestrel 68 MG Impl    IMPLANON/NEXPLANON     1 each (68 mg) by Subdermal route continuous       hydrOXYzine 25 MG capsule    VISTARIL    60 capsule    Take 1-2 capsules (25-50 mg) by mouth nightly as needed for other (sleep)       sertraline 25 MG tablet    ZOLOFT    90 tablet    Take 3 tablets (75 mg) by mouth daily

## 2017-06-20 NOTE — PROGRESS NOTES
SUBJECTIVE       Agatha Rosales is a 22 year old  female with a PMH significant for:     Patient Active Problem List   Diagnosis     Screening for depression     Language barrier, cultural differences     Tension headache     History of Helicobacter pylori infection     Bilateral low back pain without sciatica     Dermatitis     Severe major depressive disorder (H)     Other personal history of psychological trauma, not elsewhere classified     High level of expressed emotion within family     Postpartum depression: At our last visit two weeks ago, Agatha had been tolerating her Zoloft well and was starting to feel better. We decided to increase her dose of Zoloft from 50 mg to 75 mg daily, although she has not yet made that change and has continued taking 50 mg. She had been doing well after that visit. However, on Saturday (3 days ago),  she had a headache that caused her to become anxious. She had some associated heart palpitations and difficulty breathing. Since that time, she has had increased anxiety, sadness, and fatigue as well as decreased appetite and difficulty falling asleep at night. She notes decreased interest in her daily activities, but still enjoys spending time with her children. She denies any suicidal ideation.  She no longer notes the headache. She endorses no recent trauma or trigger for the recently worsened symptoms. She feels safe at home and with her .     Additionally she reports dizziness and blurred vision after she stands from sitting. She has never fallen down or lost consciousness. She has not been eating or drinking much recently. She denies any chest pain, nausea, or vomiting.    PMH, Medications and Allergies were reviewed and updated as needed.        REVIEW OF SYSTEMS     Pertinent positives and negatives noted in HPI.        OBJECTIVE     Vitals:    06/20/17 1018   BP: 100/66   Pulse: 86   Temp: 98.3  F (36.8  C)   SpO2: 100%   Weight: 95 lb (43.1 kg)   Height:  "4' 10.27\" (148 cm)     Body mass index is 19.67 kg/(m^2).    Constitutional: Awake, alert, cooperative, no apparent distress.  Neck: Supple, symmetrical,  no adenopathy, thyroid symmetric, not enlarged and no tenderness or nodules, skin normal.  Lungs: Lungs clear to auscultation bilaterally, no crackles or wheezing.  Cardiovascular: Regular rate and rhythm, normal S1 and S2, no S3 or S4, and no murmur noted.  Psych: Flat affect, pleasant mood.     No results found for this or any previous visit (from the past 24 hour(s)).      ASSESSMENT AND PLAN     Postpartum depression  Recent worsening of her depressive symptoms as well as heightened anxiety, and what sounds like may have been a panic attack. Pt did not increase dose of Zoloft to 75 mg as discussed at last visit. We recommended she start taking 75 mg of Zoloft daily since she did initially note improvement on the lower dose, and counseled her on how to take her medications. We suggested she try taking her hydroxyzine one hour before bed to help with sleep. She can also take 1 or 2 tablets of hydroxyzine every 6 hrs prn during the day when she becomes anxious. We also encouraged her to meet with Dr. Ballard as scheduled on 6/23/17 for her first psychotherapy session. We will check her TSH today as her thyroid function has not been evaluated in the past, and if abnormal, this could contribute to depressive or anxiety symptoms. Will see back in 2 weeks to recheck mood and response to the increased Zoloft.     Lightheadedness  Could be related to reduced PO intake. Vital signs within normal limits.  Recommended she increase her fluid intake, and make sure she is getting proper nutrition with regular meals and snacks. For her symptoms, we will check CBC to rule out anemia, TSH to evaluate for thyroid abnormality, and a BMP to check a glucose and electrolytes.      RTC in 2 for follow up of depression or sooner if develops new or worsening symptoms.    Jessica DE LEON" Tammi Carpenter am acting as scribe for Dr. Bernadette Mathews MD.    The medical student acted as a scribe and the encounter documented above was performed completely by me and the documentation accurately reflects the work I have performed today.  Bernadette Mathews      Patient's plan of care was discussed with Dr. Arnett.    Bernadette Mathews MD PGY-3  Pager #: [phone]

## 2017-06-20 NOTE — NURSING NOTE
name: Paresh Chavez  Language: Perri  Agency: [facility]  Phone number: [phone]

## 2017-06-20 NOTE — PROGRESS NOTES
Preceptor attestation:  Patient seen and discussed with the resident. Assessment and plan reviewed with resident and agreed upon.  Supervising physician: Samuel Arnett MD  [facility]

## 2017-06-20 NOTE — LETTER
June 21, 2017      Agatha Rosales  [address] [place] [address] [address]  [place] [address]        Dear Agatha,    I have received your lab results from 6/20/17. The results were normal, including your blood counts, electrolytes like sodium and potassium, kidney function, and thyroid test. This is good news. We can discuss these results more at your upcoming follow-up visit in 2 weeks. Please feel free to call the clinic at [phone] if you have any questions or concerns.     Please see below for your test results.    Resulted Orders   CBC with Plt ([facility])   Result Value Ref Range    WBC 8.4 4.0 - 11.0 K/uL    RBC 5.0 3.8 - 5.2 M/uL    Hemoglobin 14.2 11.7 - 15.7 g/dL    Hematocrit 45.1 35.0 - 47.0 %    MCV 90.6 78.0 - 100.0 fL    MCH 28.5 26.5 - 35.0    MCHC 31.5 (L) 32.0 - 36.0 g/dL    Platelets 237.0 150.0 - 450.0 K/uL   Basic Metabolic Panel ([facility])  - Results < 1 hr   Result Value Ref Range    Urea Nitrogen 19.1 (H) 7.0 - 19.0 mg/dL    Calcium 9.6 8.5 - 10.1 mg/dL    Chloride 106.4 98.0 - 110.0 mmol/L    Carbon Dioxide 21.3 20.0 - 32.0 mmol/L    Creatinine 0.8 0.5 - 1.0 mg/dL    Glucose 98.0 70.0 - 99.0 mg'dL    Potassium 3.8 3.2 - 4.6 mmol/dL    Sodium 140.3 132.0 - 142.0 mmol/L    GFR Estimate >90 >60.0 mL/min/1.7 m2    GFR Estimate If Black >90 >60.0 mL/min/1.7 m2   TSH  Sensitive ([facility])   Result Value Ref Range    TSH 1.65 0.30 - 5.00 uIU/mL    Narrative    Test performed by:  [facility]  [address] [place] [address]       If you have any questions, please call the clinic to make an appointment.    Sincerely,    Bernadette Mathews MD

## 2017-06-21 ASSESSMENT — ASTHMA QUESTIONNAIRES: ACT_TOTALSCORE: 25

## 2017-06-21 ASSESSMENT — PATIENT HEALTH QUESTIONNAIRE - PHQ9: SUM OF ALL RESPONSES TO PHQ QUESTIONS 1-9: 14

## 2017-06-21 ASSESSMENT — ANXIETY QUESTIONNAIRES: GAD7 TOTAL SCORE: 0

## 2017-06-21 NOTE — PROGRESS NOTES
Please send patient the following letter:    Agatha,    I have received your lab results from 6/20/17. The results were normal, including your blood counts, electrolytes like sodium and potassium, kidney function, and thyroid test. This is good news. We can discuss these results more at your upcoming follow-up visit in 2 weeks. Please feel free to call the clinic at [phone] if you have any questions or concerns.     Sincerely,  Bernadette Mathews MD

## 2017-06-23 ENCOUNTER — TELEPHONE (OUTPATIENT)
Dept: FAMILY MEDICINE | Facility: CLINIC | Age: 22
End: 2017-06-23

## 2017-06-23 NOTE — TELEPHONE ENCOUNTER
Placed outreach call, as Ms. Rosales cancelled her  intake appointment this morning. Agatha was unable to come to today's appointment because she was unable to find childcare for her 4 children. This was the same reason she has not continued to attend therapy at [facility] (assessed there on 4/19/17). She explained that she was referred for in-home therapy through [facility], but has not heard anything from them and does not know if she will still receive that service. Agreed to call [facility] to inquire about status of that referral.    Spoke with [facility]  who confirmed that Agatha is active in their system and is assigned to Farnaz Baird as a tharpist for in clinic services. Agatha has cancelled a number of those visits, so it appears in-clinic therapy is not feasible right now. Left a message for Ms. Baird requesting call back to help arrange in home services.     ADDENDUM: Spoke with Farnaz Pardo - she last met with Agatha last week and Agatha requested to stop therapy services at that time. Farnaz Pardo offered in home therapy and Agatha declined. Agatha was referred for  case management and was agreeable to that (perhaps this is the service she was referencing in my call with her?).  has had difficulty reaching Agatha to schedule intake. Confirmed phone number with Farnaz Pardo. Farnaz Pardo agreed to call Agatha in the next 1-2 days to reassess interest in in-home therapy or possibly [facility] services. She'll also reach out to  to check on status for assessment.    Brit Ballard, Ph.D.,   Behavioral Health Fellow

## 2017-06-27 ENCOUNTER — TELEPHONE (OUTPATIENT)
Dept: FAMILY MEDICINE | Facility: CLINIC | Age: 22
End: 2017-06-27

## 2017-06-27 NOTE — TELEPHONE ENCOUNTER
[facility] phone call message- general phone call:    Reason for call: Pt declined services for in home therapy yesterday.  You do not need to call her back but can if you need to.    Return call needed: No    OK to leave a message on voice mail? No    Primary language: Perri      needed? Yes    Call taken on June 27, 2017 at 1:28 PM by Brian Mims

## 2017-07-12 RX ORDER — SERTRALINE HYDROCHLORIDE 25 MG/1
75 TABLET, FILM COATED ORAL DAILY
Qty: 90 TABLET | Refills: 0 | Status: SHIPPED | OUTPATIENT
Start: 2017-07-12 | End: 2017-07-28 | Stop reason: DRUGHIGH

## 2017-07-28 ENCOUNTER — DOCUMENTATION ONLY (OUTPATIENT)
Dept: PSYCHOLOGY | Facility: CLINIC | Age: 22
End: 2017-07-28

## 2017-07-28 ENCOUNTER — OFFICE VISIT (OUTPATIENT)
Dept: FAMILY MEDICINE | Facility: CLINIC | Age: 22
End: 2017-07-28

## 2017-07-28 VITALS
DIASTOLIC BLOOD PRESSURE: 65 MMHG | BODY MASS INDEX: 20.54 KG/M2 | SYSTOLIC BLOOD PRESSURE: 98 MMHG | OXYGEN SATURATION: 100 % | TEMPERATURE: 98.5 F | HEART RATE: 86 BPM | WEIGHT: 99.2 LBS

## 2017-07-28 DIAGNOSIS — F32.2 SEVERE MAJOR DEPRESSIVE DISORDER (H): Primary | ICD-10-CM

## 2017-07-28 RX ORDER — SERTRALINE HYDROCHLORIDE 100 MG/1
100 TABLET, FILM COATED ORAL DAILY
Qty: 90 TABLET | Refills: 3 | Status: SHIPPED | OUTPATIENT
Start: 2017-07-28 | End: 2017-10-20

## 2017-07-28 NOTE — NURSING NOTE
name: Paresh Chavez  Language: Perri  Agency: [facility]  Phone number: [phone]

## 2017-07-28 NOTE — MR AVS SNAPSHOT
After Visit Summary   2017    Agatha Rosales    MRN: [id number]           Patient Information     Date Of Birth          1995        Visit Information        Provider Department      2017 3:30 PM Ramesh Randall MD [facility]        Today's Diagnoses     Severe major depressive disorder (H)    -  1      Care Instructions    See documentation encounter for details.   Shruthi  17            Follow-ups after your visit        Additional Services     MENTAL HEALTH REFERRAL       Please follow up with Dr. Ballard for two more visits before she leaves.                  Your next 10 appointments already scheduled     Aug 25, 2017  4:10 PM CDT   Return Visit with Ramesh Randall MD   [facility] ([facility])    [address] [place] [address]   [phone]              Who to contact     Please call your clinic at [phone] to:    Ask questions about your health    Make or cancel appointments    Discuss your medicines    Learn about your test results    Speak to your doctor   If you have compliments or concerns about an experience at your clinic, or if you wish to file a complaint, please contact [facility] Patient Relations at [phone] or email us at [email or web address]         Additional Information About Your Visit        MyChart Information     AppMeshhart is an electronic gateway that provides easy, online access to your medical records. With Privy, you can request a clinic appointment, read your test results, renew a prescription or communicate with your care team.     To sign up for Trueffectt visit the website at [email or web address]   You will be asked to enter the access code listed below, as well as some personal information. Please follow the directions to create your username and password.     Your access code is: [email or web address]  Expires: 10/29/2017  3:33 PM     Your access code will  in 90 days. If you need  help or a new code, please contact your [facility] or call [phone] for assistance.        Care EveryWhere ID     This is your Care EveryWhere ID. This could be used by other organizations to access your [facility] medical records  [id number]        Your Vitals Were     Pulse Temperature Pulse Oximetry BMI (Body Mass Index)          86 98.5  F (36.9  C) (Oral) 100% 20.54 kg/m2         Blood Pressure from Last 3 Encounters:   07/28/17 98/65   06/20/17 100/66   06/06/17 91/55    Weight from Last 3 Encounters:   07/28/17 99 lb 3.2 oz (45 kg)   06/20/17 95 lb (43.1 kg)   06/06/17 98 lb 6.4 oz (44.6 kg)                 Today's Medication Changes          These changes are accurate as of: 7/28/17 11:59 PM.  If you have any questions, ask your nurse or doctor.               These medicines have changed or have updated prescriptions.        Dose/Directions    sertraline 100 MG tablet   Commonly known as:  ZOLOFT   This may have changed:    - medication strength  - how much to take   Used for:  Severe major depressive disorder (H)   Changed by:  Ramesh Randall MD        Dose:  100 mg   Take 1 tablet (100 mg) by mouth daily   Quantity:  90 tablet   Refills:  3            Where to get your medicines      These medications were sent to [facility] - [place], [place] - [address], [place] [address]     Phone:  [phone]     sertraline 100 MG tablet                Primary Care Provider Office Phone # Fax #    Amanda Sharon Dimas [phone]       [facility]  [address] [place] [address]        Equal Access to Services     CORAZON LAURA : Hadii jasson Rodriguez, waaxda kimi, qaybta kacruz gore. So [facility] [phone].    ATENCIÓN: Si habla español, tiene a curtis disposición servicios gratuitos de asistencia lingüística. Llame al [phone].    We comply with  applicable federal civil rights laws and [place] laws. We do not discriminate on the basis of race, color, national origin, age, disability sex, sexual orientation or gender identity.            Thank you!     Thank you for choosing [facility]  for your care. Our goal is always to provide you with excellent care. Hearing back from our patients is one way we can continue to improve our services. Please take a few minutes to complete the written survey that you may receive in the mail after your visit with us. Thank you!             Your Updated Medication List - Protect others around you: Learn how to safely use, store and throw away your medicines at www.disposemymeds.org.          This list is accurate as of: 7/28/17 11:59 PM.  Always use your most recent med list.                   Brand Name Dispense Instructions for use Diagnosis    acetaminophen 325 MG tablet    TYLENOL    100 tablet    Take 1-2 tablets (325-650 mg) by mouth every 6 hours as needed for headaches    Tension headache       etonogestrel 68 MG Impl    IMPLANON/NEXPLANON     1 each (68 mg) by Subdermal route continuous    Insertion of implantable subdermal contraceptive       hydrOXYzine 25 MG capsule    VISTARIL    60 capsule    Take 1-2 capsules (25-50 mg) by mouth nightly as needed for other (sleep)    Persistent insomnia       sertraline 100 MG tablet    ZOLOFT    90 tablet    Take 1 tablet (100 mg) by mouth daily    Severe major depressive disorder (H)

## 2017-07-28 NOTE — PROGRESS NOTES
A mental health referral was placed for patient today.   I see that there was a missed appointment for a recently scheduled mental health visit.   I have spoken with patient and Paresh Chavez about this. She has a very limited scheduled and could only come in for an appointment after 4:30pm when her  gets done with work.   She only would like to see a provider here and will not go to a community provider as she does not feel comfortable going anywhere else.   We discussed how our providers would not be able to accommodate to that time frame. She will discuss this with her  and let Paresh Chavez know if there is another time they could work out to come into see a provider.

## 2017-07-28 NOTE — PROGRESS NOTES
Thanks for all your work on this referral, Shruthi.    Dr. Randall - I see in your order you requested follow-up with me for 2 visits before I graduate. Was there a particular question or plan for these appointments? Unfortunately the note from today isn't in, so hopefully we can touch base about this next week.    I feel like we're pretty limited in what we can offer right now. Agatha was offered in-home therapy though [facility] (which would overcome the barriers of childcare and going to a new clinic) last month and declined this offer. She did accept [facility] case management services though, and they continue to work with her as far as I am aware. Maybe she had more ideas during the recent visit that we can help her with.    Brit Ballard, PhD

## 2017-07-31 NOTE — PROGRESS NOTES
I'm not sure we will be able to accommodate for the appointment after 4:30pm here in clinic. She was very resistant to anywhere else when we spoke, however, was also unable to accommodate for our clinic hours. I'm at a loss for what else to do moving forward with this referral. Please advise.

## 2017-07-31 NOTE — PROGRESS NOTES
"Subjective: Agatha Rosales is a 22 year old who presents today for f/u of MDD.  She has followed with Dr. Mathews and others here in the clinic; this is my first time seeing her.  She was diagnosed with post-partum despression and was given sertraline.  She was receiving 25 mg pills and then this was increased to 50 and 75 mg.  She brings in her bottles now and states that she takes 3 pills from the 25mg bottle and 2 pills from the 50mg bottle every day so she is actually taking 175 mg/d.  She reports no untoward effects.  She is still not sleeping the best and is still very anxious.  She is not doing any counseling with now.    PMHX/PSHX/MEDS/ALLERGIES/SHX/FHX reviewed and updated in [employer].   REVIEW OF SYSTEMS    General: No fevers  Head: No headache  Neck: No swallowing problems   CV: No chest pain or palpitations  Resp: No shortness of breath.  No cough. No hemoptysis.  GI: No constipation, or diarrhea.  No nausea or vomiting  : No urinary c/o    Objective: BP 98/65  Pulse 86  Temp 98.5  F (36.9  C) (Oral)  Wt 99 lb 3.2 oz (45 kg)  SpO2 100%  BMI 20.54 kg/m2   Gen: Well nourished and in NAD   HEENT: TMs normal color and landmarks, nasopharynx pink and moist, oropharynx pink and moist  CV: RRR - no murmurs, rubs, or gallups,   Pulm: CTAB, no wheezes/rales/rhonchi, good air entry   ABD: soft, nontender, BS intact  Extrem: no cyanosis, edema or clubbing   Psych: Euthymic    Assessment/ Plan:  1. Severe major depressive disorder (H)  We will have her restart counseling and start taking sertraline 100 mg daily.  The other 2 bottles of sertraline were taped over and the  wrote \"stop\" on them in Perri.  - MENTAL HEALTH REFERRAL; Future  - sertraline (ZOLOFT) 100 MG tablet; Take 1 tablet (100 mg) by mouth daily  Dispense: 90 tablet; Refill: 3    Total of 25 minutes was spent in face to face contact with patient with > 50% in counseling and coordination of care.  Options for treatment and/or follow-up care " were reviewed with the patient. Agatha Rosales was engaged and actively involved in the decision making process. She verbalized understanding of the options discussed and was satisfied with the final plan.    Ramesh Randall

## 2017-07-31 NOTE — TELEPHONE ENCOUNTER
The patient is not doing any counseling right now and is still struggling quite a bit.  I thought if she could see you once or twice before you left that would be helpful and she said she was open to that.    Does that work?  Is that possible?    Ramesh

## 2017-07-31 NOTE — PROGRESS NOTES
We could reach out to her [facility] therapist to see if in home therapy is still an option. That would not require her to go to a new clinic, and would accommodate her schedule. Could we have Paresh Chavez see if she is willing to try this?     Brit Ballard, Ph.D.,   Behavioral Health Fellow

## 2017-08-01 NOTE — PROGRESS NOTES
Hi team - per Dr. Ballard's earlier note, it seems that perhaps she has a mental health care manager via [facility], but I do not see this person on the care team.  If she is still working with a care manager, perhaps we could reach out to them to help problem solve this situation?  Alternatively, we may see this very restricted ability/willingness to participate in therapy as a sign of ambivalence and work with her to resolve this.  If we could work to increase her motivation for treatment (as it ties to functional goals she may have for herself) she might be more flexible about where she goes for this, etc.     Let me know if I can offer any future support for this patient.  Thanks!  Lily

## 2017-08-25 ENCOUNTER — OFFICE VISIT (OUTPATIENT)
Dept: FAMILY MEDICINE | Facility: CLINIC | Age: 22
End: 2017-08-25

## 2017-08-25 VITALS — DIASTOLIC BLOOD PRESSURE: 62 MMHG | SYSTOLIC BLOOD PRESSURE: 95 MMHG | HEART RATE: 79 BPM | TEMPERATURE: 98.1 F

## 2017-08-25 DIAGNOSIS — F32.2 SEVERE MAJOR DEPRESSIVE DISORDER (H): Primary | ICD-10-CM

## 2017-08-25 RX ORDER — GABAPENTIN 300 MG/1
300 CAPSULE ORAL AT BEDTIME
Qty: 90 CAPSULE | Refills: 1 | Status: SHIPPED | OUTPATIENT
Start: 2017-08-25 | End: 2017-10-20

## 2017-08-25 RX ORDER — PROPRANOLOL HYDROCHLORIDE 10 MG/1
10 TABLET ORAL 2 TIMES DAILY
Qty: 90 TABLET | Refills: 1 | Status: SHIPPED | OUTPATIENT
Start: 2017-08-25 | End: 2017-10-20

## 2017-08-25 NOTE — MR AVS SNAPSHOT
After Visit Summary   2017    Agatha Rosales    MRN: [id number]           Patient Information     Date Of Birth          1995        Visit Information        Provider Department      2017 4:10 PM Ramesh Randall MD [facility]        Today's Diagnoses     Severe major depressive disorder (H)    -  1       Follow-ups after your visit        Who to contact     Please call your clinic at [phone] to:    Ask questions about your health    Make or cancel appointments    Discuss your medicines    Learn about your test results    Speak to your doctor   If you have compliments or concerns about an experience at your clinic, or if you wish to file a complaint, please contact [facility] Patient Relations at [phone] or email us at [email or web address]         Additional Information About Your Visit        MyChart Information     [email or web address] is an electronic gateway that provides easy, online access to your medical records. With [email or web address], you can request a clinic appointment, read your test results, renew a prescription or communicate with your care team.     To sign up for Nadanut visit the website at [email or web address]   You will be asked to enter the access code listed below, as well as some personal information. Please follow the directions to create your username and password.     Your access code is: [email or web address]  Expires: 10/29/2017  3:33 PM     Your access code will  in 90 days. If you need help or a new code, please contact your [facility] or call [phone] for assistance.        Care EveryWhere ID     This is your Care EveryWhere ID. This could be used by other organizations to access your [facility] medical records  [id number]        Your Vitals Were     Pulse Temperature                79 98.1  F (36.7  C) (Oral)           Blood Pressure from Last 3 Encounters:   17 95/62   17 98/65    06/20/17 100/66    Weight from Last 3 Encounters:   07/28/17 99 lb 3.2 oz (45 kg)   06/20/17 95 lb (43.1 kg)   06/06/17 98 lb 6.4 oz (44.6 kg)              Today, you had the following     No orders found for display         Today's Medication Changes          These changes are accurate as of: 8/25/17  5:09 PM.  If you have any questions, ask your nurse or doctor.               Start taking these medicines.        Dose/Directions    gabapentin 300 MG capsule   Commonly known as:  NEURONTIN   Used for:  Severe major depressive disorder (H)   Started by:  Ramesh Randall MD        Dose:  300 mg   Take 1 capsule (300 mg) by mouth At Bedtime   Quantity:  90 capsule   Refills:  1       propranolol 10 MG tablet   Commonly known as:  INDERAL   Used for:  Severe major depressive disorder (H)   Started by:  Ramesh Randall MD        Dose:  10 mg   Take 1 tablet (10 mg) by mouth 2 times daily   Quantity:  90 tablet   Refills:  1         Stop taking these medicines if you haven't already. Please contact your care team if you have questions.     hydrOXYzine 25 MG capsule   Commonly known as:  VISTARIL   Stopped by:  Ramesh Randall MD                Where to get your medicines      These medications were sent to [facility] - [place], [place] - [address], [place] [address]     Phone:  [phone]     gabapentin 300 MG capsule    propranolol 10 MG tablet                Primary Care Provider Office Phone # Fax #    Amanda Sharon Dimas [phone]       [facility]  [address] [place] [address]        Equal Access to Services     [facility] HospitalTOYA : Hadii jasson islaso Soomaali, waaxda luqadaha, qaybta kaalmada adeeglana, cruz lopez. So [facility] [phone].    ATENCIÓN: Si habla español, tiene a curtis disposición servicios gratuitos de asistencia lingüística. Llame al [phone].    We comply with applicable federal civil rights laws  and [place] laws. We do not discriminate on the basis of race, color, national origin, age, disability sex, sexual orientation or gender identity.            Thank you!     Thank you for choosing [facility]  for your care. Our goal is always to provide you with excellent care. Hearing back from our patients is one way we can continue to improve our services. Please take a few minutes to complete the written survey that you may receive in the mail after your visit with us. Thank you!             Your Updated Medication List - Protect others around you: Learn how to safely use, store and throw away your medicines at www.disposemymeds.org.          This list is accurate as of: 8/25/17  5:09 PM.  Always use your most recent med list.                   Brand Name Dispense Instructions for use Diagnosis    acetaminophen 325 MG tablet    TYLENOL    100 tablet    Take 1-2 tablets (325-650 mg) by mouth every 6 hours as needed for headaches    Tension headache       etonogestrel 68 MG Impl    IMPLANON/NEXPLANON     1 each (68 mg) by Subdermal route continuous    Insertion of implantable subdermal contraceptive       gabapentin 300 MG capsule    NEURONTIN    90 capsule    Take 1 capsule (300 mg) by mouth At Bedtime    Severe major depressive disorder (H)       propranolol 10 MG tablet    INDERAL    90 tablet    Take 1 tablet (10 mg) by mouth 2 times daily    Severe major depressive disorder (H)       sertraline 100 MG tablet    ZOLOFT    90 tablet    Take 1 tablet (100 mg) by mouth daily    Severe major depressive disorder (H)

## 2017-08-25 NOTE — NURSING NOTE
name: Cammy Reyes  Language: Perri  Agency: [facility]  Phone number: [phone]

## 2017-08-25 NOTE — PROGRESS NOTES
Subjective: Agatha Rosales is a 22 year old who presents today for f/u of her MDD and anxiety symptoms.  She has not been able to get into any counseling and she is not sure how she would do that as her  works during the day and she is at home with her child.  She does not drive.  Also she is not sure that she wants to talk about emotional problems with someone because she did this before and it only seemed to make things worse.  The palpitations are better on sertraline.  She has no CP or CP at rest but gets CP with stress or when she is startled.  She states she is sleeping better but not great.  Her appetite is better.  She is still very anxious whenever she is in a car, when she is in loud places and whenever she hears very loud noises.  Currently the greatest, predicable situation that provokes her anxiety is riding in a car.  She is very distressed by her shaking hands and arms and this is brought on by the same things as well as simply by feeling stressed.    PMHX/PSHX/MEDS/ALLERGIES/SHX/FHX reviewed and updated in [employer].   REVIEW OF SYSTEMS    General: No weight loss  Head: No headache  Neck: No swallowing problems   CV: See HPI  Resp: See HPI.  No cough. No hemoptysis.  GI: No constipation, or diarrhea.  No nausea or vomiting  : No urinary c/o     Objective: BP 95/62 (BP Location: Left arm, Patient Position: Chair, Cuff Size: Adult Regular)  Pulse 79  Temp 98.1  F (36.7  C) (Oral)   Gen: Well nourished and in NAD   CV: RRR - no murmurs, rubs, or gallups,   Pulm: CTAB, no wheezes/rales/rhonchi, good air entry   ABD: soft, nontender, BS intact  Extrem: no cyanosis, edema or clubbing   Psych: PHQ 9 = 11    Assessment/ Plan:  1. Severe major depressive disorder (H)  We will continue the sertraline at the same dose, add propranolol for the tremor and gabapentin to help with insomnia.  She does not formal counseling at this time but we can continue office counseling with me.  - propranolol  (INDERAL) 10 MG tablet; Take 1 tablet (10 mg) by mouth 2 times daily  Dispense: 90 tablet; Refill: 1  - gabapentin (NEURONTIN) 300 MG capsule; Take 1 capsule (300 mg) by mouth At Bedtime  Dispense: 90 capsule; Refill: 1    Total of 30 minutes was spent in face to face contact with patient with > 50% in counseling and coordination of care.  Options for treatment and/or follow-up care were reviewed with the patient. Agatha Rosales was engaged and actively involved in the decision making process. She verbalized understanding of the options discussed and was satisfied with the final plan.    RTC in 2 weeks for follow up of MDD and for office counseling or sooner if develops new or worsening symptoms.    Ramesh Randall

## 2017-10-20 ENCOUNTER — OFFICE VISIT (OUTPATIENT)
Dept: FAMILY MEDICINE | Facility: CLINIC | Age: 22
End: 2017-10-20

## 2017-10-20 VITALS
HEART RATE: 86 BPM | DIASTOLIC BLOOD PRESSURE: 59 MMHG | BODY MASS INDEX: 20.67 KG/M2 | OXYGEN SATURATION: 99 % | WEIGHT: 99.8 LBS | TEMPERATURE: 98.7 F | SYSTOLIC BLOOD PRESSURE: 96 MMHG

## 2017-10-20 DIAGNOSIS — F32.2 SEVERE MAJOR DEPRESSIVE DISORDER (H): ICD-10-CM

## 2017-10-20 DIAGNOSIS — Z23 NEED FOR VACCINATION: Primary | ICD-10-CM

## 2017-10-20 RX ORDER — GABAPENTIN 300 MG/1
300 CAPSULE ORAL AT BEDTIME
Qty: 90 CAPSULE | Refills: 3 | Status: SHIPPED | OUTPATIENT
Start: 2017-10-20 | End: 2018-06-26

## 2017-10-20 RX ORDER — PROPRANOLOL HYDROCHLORIDE 10 MG/1
10 TABLET ORAL 2 TIMES DAILY PRN
Qty: 120 TABLET | Refills: 3 | Status: SHIPPED | OUTPATIENT
Start: 2017-10-20 | End: 2018-06-26

## 2017-10-20 RX ORDER — SERTRALINE HYDROCHLORIDE 100 MG/1
100 TABLET, FILM COATED ORAL DAILY
Qty: 90 TABLET | Refills: 3 | Status: SHIPPED | OUTPATIENT
Start: 2017-10-20 | End: 2018-04-20

## 2017-10-20 ASSESSMENT — PATIENT HEALTH QUESTIONNAIRE - PHQ9: SUM OF ALL RESPONSES TO PHQ QUESTIONS 1-9: 9

## 2017-10-20 NOTE — NURSING NOTE
"Injectable Influenza Immunization Documentation    1.  Has the patient received the information for the injectable influenza vaccine? YES     2. Is the patient 6 months of age or older? YES     3. Does the patient have any of the following contraindications?         Severe allergy to eggs? No     Severe allergic reaction to previous influenza vaccines? No   Severe allergy to latex? No       History of Guillain-[place] syndrome? No     Currently have a temperature greater than 100.4F? No        4.  Severely egg allergic patients should have flu vaccine eligibility assessed by an MD, RN, or pharmacist, and those who received flu vaccine should be observed for 15 min by an MD, RN, Pharmacist, Medical Technician, or member of clinic staff.\": YES    5. Latex-allergic patients should be given latex-free influenza vaccine Yes. Please reference the Vaccine latex table to determine if your clinic s product is latex-containing.       Vaccination given by Farnaz Rosenberg MA        "

## 2017-10-20 NOTE — MR AVS SNAPSHOT
After Visit Summary   10/20/2017    Agatha Rosales    MRN: [id number]           Patient Information     Date Of Birth          1995        Visit Information        Provider Department      10/20/2017 4:10 PM Prema Trotter MD [facility]        Today's Diagnoses     Severe major depressive disorder (H)          Care Instructions    -Keep taking sertraline 100 mg daily for depression and gabapentin 300 mg at night for sleep  -Take propranolol for anxiety as needed 10 mg twice a day          Follow-ups after your visit        Follow-up notes from your care team     Return in about 2 months (around 2017) for Depression.      Who to contact     Please call your clinic at [phone] to:    Ask questions about your health    Make or cancel appointments    Discuss your medicines    Learn about your test results    Speak to your doctor   If you have compliments or concerns about an experience at your clinic, or if you wish to file a complaint, please contact [facility] Patient Relations at [phone] or email us at [email or web address]         Additional Information About Your Visit        MyChart Information     [employer] is an electronic gateway that provides easy, online access to your medical records. With [employer], you can request a clinic appointment, read your test results, renew a prescription or communicate with your care team.     To sign up for [employer] visit the website at [email or web address]   You will be asked to enter the access code listed below, as well as some personal information. Please follow the directions to create your username and password.     Your access code is: [email or web address]  Expires: 10/29/2017  3:33 PM     Your access code will  in 90 days. If you need help or a new code, please contact your [facility] or call [phone] for assistance.        Care EveryWhere ID     This is  your Care EveryWhere ID. This could be used by other organizations to access your [place] medical records  [id number]        Your Vitals Were     Pulse Temperature Pulse Oximetry BMI (Body Mass Index)          86 98.7  F (37.1  C) (Oral) 99% 20.67 kg/m2         Blood Pressure from Last 3 Encounters:   10/20/17 96/59   08/25/17 95/62   07/28/17 98/65    Weight from Last 3 Encounters:   10/20/17 99 lb 12.8 oz (45.3 kg)   07/28/17 99 lb 3.2 oz (45 kg)   06/20/17 95 lb (43.1 kg)              Today, you had the following     No orders found for display         Today's Medication Changes          These changes are accurate as of: 10/20/17  4:37 PM.  If you have any questions, ask your nurse or doctor.               These medicines have changed or have updated prescriptions.        Dose/Directions    propranolol 10 MG tablet   Commonly known as:  INDERAL   This may have changed:    - when to take this  - reasons to take this   Used for:  Severe major depressive disorder (H)   Changed by:  Prema Trotter MD        Dose:  10 mg   Take 1 tablet (10 mg) by mouth 2 times daily as needed   Quantity:  120 tablet   Refills:  3            Where to get your medicines      These medications were sent to [facility] - [place], [place] - [address], [place] [address]     Phone:  [phone]     gabapentin 300 MG capsule    propranolol 10 MG tablet    sertraline 100 MG tablet                Primary Care Provider Office Phone # Fax #    Amanda Sharon Dimas [phone]       [facility]  [address] [place] [address]        Equal Access to Services     [facility] AH: Hadii jasson berry Somaribel, waaxda luqadaha, qaybta kaalmada gerson, cruz lopez. So [facility] [phone].    ATENCIÓN: Si habla español, tiene a curtis disposición servicios gratuitos de asistencia lingüística. Tiara ackerman [phone].    We comply with applicable  federal civil rights laws and [place] laws. We do not discriminate on the basis of race, color, national origin, age, disability, sex, sexual orientation, or gender identity.            Thank you!     Thank you for choosing [facility]  for your care. Our goal is always to provide you with excellent care. Hearing back from our patients is one way we can continue to improve our services. Please take a few minutes to complete the written survey that you may receive in the mail after your visit with us. Thank you!             Your Updated Medication List - Protect others around you: Learn how to safely use, store and throw away your medicines at www.disposemymeds.org.          This list is accurate as of: 10/20/17  4:37 PM.  Always use your most recent med list.                   Brand Name Dispense Instructions for use Diagnosis    etonogestrel 68 MG Impl    IMPLANON/NEXPLANON     1 each (68 mg) by Subdermal route continuous    Insertion of implantable subdermal contraceptive       gabapentin 300 MG capsule    NEURONTIN    90 capsule    Take 1 capsule (300 mg) by mouth At Bedtime    Severe major depressive disorder (H)       propranolol 10 MG tablet    INDERAL    120 tablet    Take 1 tablet (10 mg) by mouth 2 times daily as needed    Severe major depressive disorder (H)       sertraline 100 MG tablet    ZOLOFT    90 tablet    Take 1 tablet (100 mg) by mouth daily    Severe major depressive disorder (H)

## 2017-10-20 NOTE — PATIENT INSTRUCTIONS
-Keep taking sertraline 100 mg daily for depression and gabapentin 300 mg at night for sleep  -Take propranolol for anxiety as needed 10 mg twice a day

## 2017-10-20 NOTE — PROGRESS NOTES
"[address] [address]  [phone]         SUBJECTIVE       Agatha Rosales is a 22 year old  female with a PMH significant for:     Patient Active Problem List   Diagnosis     Language barrier, cultural differences     History of Helicobacter pylori infection     Bilateral low back pain without sciatica     Dermatitis     Severe major depressive disorder (H)     Other personal history of psychological trauma, not elsewhere classified     High level of expressed emotion within family     She presents with medication refill.    She has been out of her medications for a week now. When she was not taking her depression medications she feels her mood is worse and \"my heart is beating\". When she takes the medication she feels it has been helpful. She is only taking the gabapentin and sertraline daily. She takes propranolol twice a day as needed for anxiety. She does not want to change the dose because she feels her mood is good and her nightmares are gone. She has been at [facility] at the past and felt it was not helpful and not interested in therapy again at this time. She denies any SI, HI. She lives at home with  and 4 kids. Feels she has family and friends support. Tolerating medication well without any headaches, dizziness, GI upset. No chest pain or SOB. She denies having any concerning impairment on her daily activities due to her depression.    Past Medical History:  Past Medical History:   Diagnosis Date     History of blood transfusion 2011       Past Surgical History:  Past Surgical History:   Procedure Laterality Date     NO HISTORY OF SURGERY         Family History:  Family History   Problem Relation Age of Onset     DIABETES No family hx of      Coronary Artery Disease No family hx of      CANCER No family hx of      Hypertension No family hx of        Social History:  Social History     Social History     Marital status:      Spouse name: N/A     Number of " children: N/A     Years of education: N/A     Occupational History     student      Social History Main Topics     Smoking status: Never Smoker     Smokeless tobacco: Never Used     Alcohol use No     Drug use: No     Sexual activity: Yes     Partners: Male     Other Topics Concern     Not on file     Social History Narrative       Medications:   Current Outpatient Prescriptions   Medication Sig Dispense Refill     gabapentin (NEURONTIN) 300 MG capsule Take 1 capsule (300 mg) by mouth At Bedtime 90 capsule 3     sertraline (ZOLOFT) 100 MG tablet Take 1 tablet (100 mg) by mouth daily 90 tablet 3     propranolol (INDERAL) 10 MG tablet Take 1 tablet (10 mg) by mouth 2 times daily as needed 120 tablet 3     [DISCONTINUED] propranolol (INDERAL) 10 MG tablet Take 1 tablet (10 mg) by mouth 2 times daily 90 tablet 1     [DISCONTINUED] gabapentin (NEURONTIN) 300 MG capsule Take 1 capsule (300 mg) by mouth At Bedtime 90 capsule 1     [DISCONTINUED] sertraline (ZOLOFT) 100 MG tablet Take 1 tablet (100 mg) by mouth daily 90 tablet 3     etonogestrel (IMPLANON/NEXPLANON) 68 MG IMPL 1 each (68 mg) by Subdermal route continuous  0       Allergies:   No Known Allergies    PMH, Surgical Hx, Family Hx, Social Hx, Medications and Allergies were reviewed and updated as needed in Epic.        REVIEW OF SYSTEMS     Please see HPI        OBJECTIVE     Vitals:    10/20/17 1621   BP: 96/59   Pulse: 86   Temp: 98.7  F (37.1  C)   TempSrc: Oral   SpO2: 99%   Weight: 99 lb 12.8 oz (45.3 kg)     Body mass index is 20.67 kg/(m^2).    Constitutional: Awake, alert, cooperative, pleasant Perri female in no apparent distress, and appears stated age. Seen with her two young children and a professional Perri .  Eyes: Lids and lashes normal, pupils equal, round and reactive to light, extra ocular muscles intact, sclera clear, conjunctiva normal.  Lungs: No increased work of breathing, good air exchange, clear to auscultation bilaterally, no  crackles or wheezing.  Cardiovascular: Regular rate and rhythm, normal S1 and S2, no S3 or S4, and no murmur noted.  Neuropsychiatric: Flat affect. Sad mood. Fair orientation, memory and insight.    PHQ-9 SCORE 6/20/2017 10/20/2017 10/20/2017   Total Score 14 9 9       ASSESSMENT AND PLAN     Agatha Rosales is a 22 year old  female with a PMH significant for MDD who presents for refill of her medications.    1. Severe major depressive disorder (H): PHQ-9 of 9 today. Contracts for safety. Denies therapy at this time. While flat affect noted on exam, she was very appropriate and attentive to her children and denies any impairment of her daily activities. Would like to continue same regimen without change so these were refilled. If worsening symptoms, could consider titrating up sertraline first.  - gabapentin (NEURONTIN) 300 MG capsule; Take 1 capsule (300 mg) by mouth At Bedtime  Dispense: 90 capsule; Refill: 3  - sertraline (ZOLOFT) 100 MG tablet; Take 1 tablet (100 mg) by mouth daily  Dispense: 90 tablet; Refill: 3  - propranolol (INDERAL) 10 MG tablet; Take 1 tablet (10 mg) by mouth 2 times daily as needed  Dispense: 120 tablet; Refill: 3    2. Need for vaccination: Due for flu shot  - ADMIN VACCINE, INITIAL  - FLU VAC QUADRIVLENT SPLIT VIRUS IM 0.5ml dosage    RTC in 2 months for follow up of mood or sooner if develops new or worsening symptoms.    Options for treatment and/or follow-up care were reviewed with the patient who was engaged and actively involved in the decision making process and verbalized understanding of the options discussed and was satisfied with the final plan.    Prema Trotter MD PGY-3  [facility]  Pager: [phone]    Patient discussed with Dr. Guillaume Lane, attending physician, who agrees with the plan.

## 2017-10-20 NOTE — PROGRESS NOTES
Preceptor attestation:  Patient seen and discussed with the resident. Assessment and plan reviewed with resident and agreed upon.  Supervising physician: Niles Lane  [facility]

## 2017-12-08 ENCOUNTER — OFFICE VISIT (OUTPATIENT)
Dept: FAMILY MEDICINE | Facility: CLINIC | Age: 22
End: 2017-12-08

## 2017-12-08 VITALS
OXYGEN SATURATION: 99 % | SYSTOLIC BLOOD PRESSURE: 91 MMHG | DIASTOLIC BLOOD PRESSURE: 59 MMHG | WEIGHT: 99 LBS | TEMPERATURE: 98.3 F | HEART RATE: 99 BPM | BODY MASS INDEX: 20.5 KG/M2

## 2017-12-08 DIAGNOSIS — G44.219 EPISODIC TENSION-TYPE HEADACHE, NOT INTRACTABLE: Primary | ICD-10-CM

## 2017-12-08 DIAGNOSIS — N63.0 LUMP OR MASS IN BREAST: ICD-10-CM

## 2017-12-08 DIAGNOSIS — Z97.5 NEXPLANON IN PLACE: ICD-10-CM

## 2017-12-08 DIAGNOSIS — F43.10 PTSD (POST-TRAUMATIC STRESS DISORDER): ICD-10-CM

## 2017-12-08 RX ORDER — IBUPROFEN 400 MG/1
400 TABLET, FILM COATED ORAL EVERY 8 HOURS PRN
Qty: 120 TABLET | Refills: 1 | Status: SHIPPED | OUTPATIENT
Start: 2017-12-08 | End: 2018-01-05

## 2017-12-08 RX ORDER — PRAZOSIN HYDROCHLORIDE 1 MG/1
1 CAPSULE ORAL AT BEDTIME
Qty: 90 CAPSULE | Refills: 3 | Status: SHIPPED | OUTPATIENT
Start: 2017-12-08 | End: 2018-06-26

## 2017-12-08 ASSESSMENT — PATIENT HEALTH QUESTIONNAIRE - PHQ9: SUM OF ALL RESPONSES TO PHQ QUESTIONS 1-9: 12

## 2017-12-08 NOTE — PROGRESS NOTES
[address] [address]  [phone]         SUBJECTIVE       Agatha Rosales is a 22 year old  female with a PMH significant for:     Patient Active Problem List   Diagnosis     Language barrier, cultural differences     History of Helicobacter pylori infection     Bilateral low back pain without sciatica     Dermatitis     Severe major depressive disorder (H)     Other personal history of psychological trauma, not elsewhere classified     High level of expressed emotion within family     PTSD (post-traumatic stress disorder)     Nexplanon in place     She presents with depression follow up:    -Depression and Anxiety: On propranolol 10 mg BID prn for anxiety, gabapentin 300 mg QHS, and sertraline 100 mg daily. Has been to therapy in the past, but felt it was not helpful and declines it again today. Has 4 kids and breastfeeding the youngest. Lives with . Denies any thoughts of self harm or harm to her family. Energy feels low but mood overall ok except she is having nightmares of people shooting her. She also stopped taking her medications the last week because she was having headaches on the top of her head that are achy and last for a few hours without any provocative or palliative factors. Tried tylenol with minimal relief. No head trauma, vision changes, dizziness, syncope, or focal weakness. No nausea or vomiting. She was worried taking tylenol would interact with her depression medications so she stopped taking them.    -Breast lump: has had lumps in her breast come and go lasting for a few days for the last 8 years. The occur in both breasts in various locations. Occurs randomly with no noticeable association with menstrual cycle or breastfeeding. She is breastfeeding. It does not hurt to breastfeed.There is no redness or discharge. Previously the lump does not hurt, but this time in her right breast the lump is more painful and sore when she touches it. No personal or  family history of breast cancer. Has not had period childbirth and nexplanon placement.    Past Medical History:  Past Medical History:   Diagnosis Date     History of blood transfusion 2011       Past Surgical History:  Past Surgical History:   Procedure Laterality Date     NO HISTORY OF SURGERY         Family History:  Family History   Problem Relation Age of Onset     DIABETES No family hx of      Coronary Artery Disease No family hx of      CANCER No family hx of      Hypertension No family hx of        Social History:  Social History     Social History     Marital status:      Spouse name: N/A     Number of children: N/A     Years of education: N/A     Occupational History     student      Social History Main Topics     Smoking status: Never Smoker     Smokeless tobacco: Never Used     Alcohol use No     Drug use: No     Sexual activity: Yes     Partners: Male     Other Topics Concern     Not on file     Social History Narrative       Medications:   Current Outpatient Prescriptions   Medication Sig Dispense Refill     ibuprofen (ADVIL/MOTRIN) 400 MG tablet Take 1 tablet (400 mg) by mouth every 8 hours as needed for moderate pain, pain or other (headaches) 120 tablet 1     prazosin (MINIPRESS) 1 MG capsule Take 1 capsule (1 mg) by mouth At Bedtime 90 capsule 3     gabapentin (NEURONTIN) 300 MG capsule Take 1 capsule (300 mg) by mouth At Bedtime 90 capsule 3     sertraline (ZOLOFT) 100 MG tablet Take 1 tablet (100 mg) by mouth daily 90 tablet 3     propranolol (INDERAL) 10 MG tablet Take 1 tablet (10 mg) by mouth 2 times daily as needed 120 tablet 3     etonogestrel (IMPLANON/NEXPLANON) 68 MG IMPL 1 each (68 mg) by Subdermal route continuous  0       Allergies:   No Known Allergies    PMH, Surgical Hx, Family Hx, Social Hx, Medications and Allergies were reviewed and updated as needed in Epic.        REVIEW OF SYSTEMS     Please see HPI        OBJECTIVE     Vitals:    12/08/17 1524 12/08/17 1601   BP:  99/66 91/59   Pulse: 93 99   Temp: 98.7  F (37.1  C) 98.3  F (36.8  C)   SpO2: 97% 99%   Weight: 99 lb (44.9 kg)      Body mass index is 20.5 kg/(m^2).    Constitutional: Awake, alert, cooperative, young Perri female in no apparent distress, and appears stated age. Seen with her young daughter and a professional Perri .  Eyes: Lids and lashes normal, pupils equal, round and reactive to light, extra ocular muscles intact, sclera clear, conjunctiva normal.  ENT: Normocephalic, without obvious abnormality, atraumatic.  Lungs: No increased work of breathing, good air exchange, clear to auscultation bilaterally, no crackles or wheezing.  Cardiovascular: Regular rate and rhythm, normal S1 and S2, no S3 or S4, and no murmur noted.  Chest / Breast: Breasts symmetrical, skin without lesion(s), no nipple retraction or dimpling, no nipple discharge, no masses palpated, no axillary or supraclavicular adenopathy. Slight fullness palpated over 11 o'clock position of right breast.   Neurologic: Awake, alert, oriented to name, place and time.  Cranial nerves II-XII are grossly intact.  Motor is 5 out of 5 bilaterally. Sensation, reflexes and gait normal.  Neuropsychiatric: Flat affect. Sad to normal mood. Normal orientation, memory and insight.    PHQ-9 SCORE 10/20/2017 10/20/2017 12/8/2017   Total Score 9 9 12         ASSESSMENT AND PLAN     Agatha Rosales is a 22 year old Perri female with a PMH significant for nexplanon, MDD, and anxiety in place who presents for mood follow up.    1. Episodic tension-type headache, not intractable: No focal neurological deficits or concerning symptoms. Suspect tension headache and given no relief with tylenol, will trial NSAID.   - ibuprofen (ADVIL/MOTRIN) 400 MG tablet; Take 1 tablet (400 mg) by mouth every 8 hours as needed for moderate pain, pain or other (headaches)  Dispense: 120 tablet; Refill: 1    2. PTSD (post-traumatic stress disorder), Anxiety, and MDD: Contracts for  safety. Having more nightmares so will trial prazosin for PTSD symptoms. Given lower end blood pressure, discussed precautions to take and will stop if does not tolerate. Discussed it was ok to take breastfeeding and symptoms to monitor for. Will continue gabapentin, sertraline, and prn propranolol without change. Declines therapy.  - prazosin (MINIPRESS) 1 MG capsule; Take 1 capsule (1 mg) by mouth At Bedtime  Dispense: 90 capsule; Refill: 3  -Crisis resources provided    3. Lump or mass in breast: Comes and goes in both breast for the last 8 years. No concerning associated symptoms. Most recent lump is more painful so will image to further investigate. No signs of mastitis and can continue breastfeeding.  - US Breast Right Complete 4 Quadrants; Future      RTC in 1 month for follow up of headache, breast lump, and mood or sooner if develops new or worsening symptoms.    Options for treatment and/or follow-up care were reviewed with the patient who was engaged and actively involved in the decision making process and verbalized understanding of the options discussed and was satisfied with the final plan.    Prema Trotter MD PGY-3  [facility] Medicine  Pager: [phone]    Patient discussed with Dr. Lisseth Finch, attending physician, who agrees with the plan.

## 2017-12-08 NOTE — MR AVS SNAPSHOT
After Visit Summary   12/8/2017    Agatha Rosales    MRN: [id number]           Patient Information     Date Of Birth          1995        Visit Information        Provider Department      12/8/2017 3:10 PM Prema Trotter MD [facility]        Today's Diagnoses     Episodic tension-type headache, not intractable    -  1    PTSD (post-traumatic stress disorder)        Lump or mass in breast          Care Instructions    -Take ibuprofen 400 mg up to three times a day as needed for headache  -Start prazosin 1 mg at night for nightmares. No changes to your other depression medication  -Schedule ultrasound of breast    If you have thoughts about self harm or if you just need additional support and care, here are some resources for you:    Crisis Lines:    [facility] Adult Crisis:  [phone]  [facility] Multilingual Crisis Line:  [phone]  [facility] Adult Crisis:  [phone]    You can also consider going to the Urgent [facility] Center [facility] at the following address.  Walk ins are welcome:    [address] [place] [address]   [phone] (for 24 hour crisis consultation)    Monday - Friday 8:00am - 7:00pm  Saturday:  11:00am - 3:00pm  Sunday and Holidays Closed    If you feel at risk of immediate harm, go directly to the Emergency Department.            Follow-ups after your visit        Follow-up notes from your care team     Return in about 1 month (around 1/8/2018) for Headache, Breast Lump Follow Up.      Future tests that were ordered for you today     Open Future Orders        Priority Expected Expires Ordered    US Breast Right Complete 4 Quadrants Routine  12/8/2018 12/8/2017            Who to contact     Please call your clinic at [phone] to:    Ask questions about your health    Make or cancel appointments    Discuss your medicines    Learn about your test results    Speak to your doctor   If you have compliments or concerns about an experience  at your clinic, or if you wish to file a complaint, please contact [facility] Patient Relations at [phone] or email us at [email or web address]         Additional Information About Your Visit        Onc[employer] Information     [employer] is an electronic gateway that provides easy, online access to your medical records. With [employer], you can request a clinic appointment, read your test results, renew a prescription or communicate with your care team.     To sign up for [employer] visit the website at [email or web address]   You will be asked to enter the access code listed below, as well as some personal information. Please follow the directions to create your username and password.     Your access code is: [email or web address]  Expires: 3/8/2018  3:49 PM     Your access code will  in 90 days. If you need help or a new code, please contact your [facility] or call [phone] for assistance.        Care EveryWhere ID     This is your Care EveryWhere ID. This could be used by other organizations to access your [facility] medical records  [id number]        Your Vitals Were     Pulse Temperature Pulse Oximetry Breastfeeding? BMI (Body Mass Index)       99 98.3  F (36.8  C) 99% Yes 20.5 kg/m2        Blood Pressure from Last 3 Encounters:   17 91/59   10/20/17 96/59   17 95/62    Weight from Last 3 Encounters:   17 99 lb (44.9 kg)   10/20/17 99 lb 12.8 oz (45.3 kg)   17 99 lb 3.2 oz (45 kg)                 Today's Medication Changes          These changes are accurate as of: 17  4:07 PM.  If you have any questions, ask your nurse or doctor.               Start taking these medicines.        Dose/Directions    ibuprofen 400 MG tablet   Commonly known as:  ADVIL/MOTRIN   Used for:  Episodic tension-type headache, not intractable   Started by:  Prema Trotter MD        Dose:  400 mg   Take 1 tablet (400 mg) by mouth  every 8 hours as needed for moderate pain, pain or other (headaches)   Quantity:  120 tablet   Refills:  1       prazosin 1 MG capsule   Commonly known as:  MINIPRESS   Used for:  PTSD (post-traumatic stress disorder)   Started by:  Prema Trotter MD        Dose:  1 mg   Take 1 capsule (1 mg) by mouth At Bedtime   Quantity:  90 capsule   Refills:  3            Where to get your medicines      These medications were sent to [facility] - [place], [place] - [address], [place] [address]     Phone:  [phone]     ibuprofen 400 MG tablet    prazosin 1 MG capsule                Primary Care Provider Office Phone # Fax #    Amanda Sharon Dimas -[phone]       [facility]  [address] [place] [address]        Equal Access to Services     CORAZON LAURA : Hadii jasson bonilla hadasho Soomaali, waaxda luqadaha, qaybta kaalmada adeegyada, cruz miranda . So [facility] [phone].    ATENCIÓN: Si habla español, tiene a curtis disposición servicios gratuitos de asistencia lingüística. Tiara al [phone].    We comply with applicable federal civil rights laws and [place] laws. We do not discriminate on the basis of race, color, national origin, age, disability, sex, sexual orientation, or gender identity.            Thank you!     Thank you for choosing [facility]  for your care. Our goal is always to provide you with excellent care. Hearing back from our patients is one way we can continue to improve our services. Please take a few minutes to complete the written survey that you may receive in the mail after your visit with us. Thank you!             Your Updated Medication List - Protect others around you: Learn how to safely use, store and throw away your medicines at www.disposemymeds.org.          This list is accurate as of: 12/8/17  4:07 PM.  Always use your most recent med list.                   Brand Name Dispense  Instructions for use Diagnosis    etonogestrel 68 MG Impl    IMPLANON/NEXPLANON     1 each (68 mg) by Subdermal route continuous    Insertion of implantable subdermal contraceptive       gabapentin 300 MG capsule    NEURONTIN    90 capsule    Take 1 capsule (300 mg) by mouth At Bedtime    Severe major depressive disorder (H)       ibuprofen 400 MG tablet    ADVIL/MOTRIN    120 tablet    Take 1 tablet (400 mg) by mouth every 8 hours as needed for moderate pain, pain or other (headaches)    Episodic tension-type headache, not intractable       prazosin 1 MG capsule    MINIPRESS    90 capsule    Take 1 capsule (1 mg) by mouth At Bedtime    PTSD (post-traumatic stress disorder)       propranolol 10 MG tablet    INDERAL    120 tablet    Take 1 tablet (10 mg) by mouth 2 times daily as needed    Severe major depressive disorder (H)       sertraline 100 MG tablet    ZOLOFT    90 tablet    Take 1 tablet (100 mg) by mouth daily    Severe major depressive disorder (H)

## 2017-12-08 NOTE — PROGRESS NOTES
Preceptor attestation:  Vital signs reviewed: BP 99/66  Pulse 93  Temp 98.7  F (37.1  C)  Wt 99 lb (44.9 kg)  SpO2 97%  Breastfeeding? Yes  BMI 20.5 kg/m2    Patient seen and discussed with the resident. Assessment and plan reviewed with resident and agreed upon.    Supervising physician: Lisseth Finch MD  [facility]

## 2017-12-08 NOTE — PATIENT INSTRUCTIONS
-Take ibuprofen 400 mg up to three times a day as needed for headache  -Start prazosin 1 mg at night for nightmares. No changes to your other depression medication  -Schedule ultrasound of breast    If you have thoughts about self harm or if you just need additional support and care, here are some resources for you:    Crisis Lines:    [facility] Adult Crisis:  [phone]  [facility] Multilingual Crisis Line:  [phone]  [facility] Adult Crisis:  [phone]    You can also consider going to the Urgent Care Center for Adult [facility] Health at the following address.  Walk ins are welcome:    [address] [place] [address]   [phone] (for 24 hour crisis consultation)    Monday - Friday 8:00am - 7:00pm  Saturday:  11:00am - 3:00pm  Sunday and Holidays Closed    If you feel at risk of immediate harm, go directly to the Emergency Department.    ULTRASOUND REFERRAL  [facility]    Address   [address] [place] [address], [address]  [place] [address]    Telephone   [phone]    12/21/17 1:30 PM    Heena Shah CMA

## 2017-12-08 NOTE — NURSING NOTE
name: Paresh Chavez  Language: Perri  Agency: [facility]  Phone number: [phone]

## 2017-12-11 PROBLEM — F43.10 PTSD (POST-TRAUMATIC STRESS DISORDER): Status: ACTIVE | Noted: 2017-12-11

## 2017-12-11 PROBLEM — Z97.5 NEXPLANON IN PLACE: Status: ACTIVE | Noted: 2017-12-11

## 2018-01-05 ENCOUNTER — OFFICE VISIT (OUTPATIENT)
Dept: FAMILY MEDICINE | Facility: CLINIC | Age: 23
End: 2018-01-05
Payer: COMMERCIAL

## 2018-01-05 VITALS
DIASTOLIC BLOOD PRESSURE: 61 MMHG | HEART RATE: 73 BPM | RESPIRATION RATE: 24 BRPM | BODY MASS INDEX: 20.58 KG/M2 | WEIGHT: 99.4 LBS | SYSTOLIC BLOOD PRESSURE: 97 MMHG | OXYGEN SATURATION: 99 % | TEMPERATURE: 98.3 F

## 2018-01-05 DIAGNOSIS — F43.10 PTSD (POST-TRAUMATIC STRESS DISORDER): Primary | ICD-10-CM

## 2018-01-05 DIAGNOSIS — F32.2 SEVERE MAJOR DEPRESSIVE DISORDER (H): ICD-10-CM

## 2018-01-05 DIAGNOSIS — G44.219 EPISODIC TENSION-TYPE HEADACHE, NOT INTRACTABLE: ICD-10-CM

## 2018-01-05 ASSESSMENT — PATIENT HEALTH QUESTIONNAIRE - PHQ9: SUM OF ALL RESPONSES TO PHQ QUESTIONS 1-9: 10

## 2018-01-05 NOTE — MR AVS SNAPSHOT
After Visit Summary   1/5/2018    Agatha Rosales    MRN: [id number]           Patient Information     Date Of Birth          1995        Visit Information        Provider Department      1/5/2018 4:10 PM Prema Trotter MD [facility]        Today's Diagnoses     PTSD (post-traumatic stress disorder)    -  1    Severe major depressive disorder (H)        Episodic tension-type headache, not intractable          Care Instructions    -We will refer you to therapy. Someone will call to to schedule. Let us know if you have not heard in a week  -Start taking Excedrin migraine every 8 hours as needed for headaches. Drink lots of fluids  -No changes to your other medications.   -Let us know if the breast pain returns and we can reschedule the ultrasound          Follow-ups after your visit        Additional Services     MENTAL HEALTH REFERRAL  -       Use this form for behavioral health consults and assessments. The referral coordinator will help to determine whether patients are best served by clinic behavioral health staff or by community providers.    Presenting Problem: depressed mood, decreased energy level, nightmares    Currently having suicidal thoughts: No  Previous psych hospitalization: No    Type of referral(s) requested (indicate all that apply):  Adult Psychotherapy--for diagnosis and/or non-pharmacological treatment of mental health diagnosis. Would like to see if she qualifies for in-home therapy. She has young children it is difficult to come in for routine visits.     needed:Yes  Language: Perri                  Follow-up notes from your care team     Return in about 3 months (around 4/5/2018) for Mood Follow Up.      Who to contact     Please call your clinic at [phone] to:    Ask questions about your health    Make or cancel appointments    Discuss your medicines    Learn about your test results    Speak to your doctor   If you have compliments or concerns  about an experience at your clinic, or if you wish to file a complaint, please contact [facility] Patient Relations at [phone] or email us at [email or web address]         Additional Information About Your Visit        [employer] Information     [employer] is an electronic gateway that provides easy, online access to your medical records. With [employer], you can request a clinic appointment, read your test results, renew a prescription or communicate with your care team.     To sign up for [employer] visit the website at [email or web address]   You will be asked to enter the access code listed below, as well as some personal information. Please follow the directions to create your username and password.     Your access code is: [email or web address]  Expires: 3/8/2018  3:49 PM     Your access code will  in 90 days. If you need help or a new code, please contact your [facility] Clinic or call [phone] for assistance.        Care EveryWhere ID     This is your Care EveryWhere ID. This could be used by other organizations to access your [facility] medical records  [id number]        Your Vitals Were     Pulse Temperature Respirations Pulse Oximetry BMI (Body Mass Index)       73 98.3  F (36.8  C) (Oral) 24 99% 20.58 kg/m2        Blood Pressure from Last 3 Encounters:   18 97/61   17 91/59   10/20/17 96/59    Weight from Last 3 Encounters:   18 99 lb 6.4 oz (45.1 kg)   17 99 lb (44.9 kg)   10/20/17 99 lb 12.8 oz (45.3 kg)              We Performed the Following     MENTAL HEALTH REFERRAL  -          Today's Medication Changes          These changes are accurate as of: 18  4:40 PM.  If you have any questions, ask your nurse or doctor.               Start taking these medicines.        Dose/Directions    aspirin-acetaminophen-caffeine 250-250-65 MG per tablet   Commonly known as:  EXCEDRIN MIGRAINE   Used for:  Episodic tension-type  headache, not intractable   Started by:  Prema Trotter MD        Dose:  1 tablet   Take 1 tablet by mouth every 8 hours as needed for headaches   Quantity:  90 tablet   Refills:  3         Stop taking these medicines if you haven't already. Please contact your care team if you have questions.     ibuprofen 400 MG tablet   Commonly known as:  ADVIL/MOTRIN   Stopped by:  Prema Trotter MD                Where to get your medicines      These medications were sent to [facility] - [place], [place] - [address], [place] [address]     Phone:  [phone]     aspirin-acetaminophen-caffeine 250-250-65 MG per tablet                Primary Care Provider Office Phone # Fax #    Amanda Sharon Dimas [phone]       [facility]  [address] [place] [address]        Equal Access to Services     NICANOR [facility] CenterTO[facility] : Hadii jasson bonilla hadasho Somaribel, waaxda luqadaha, qaybta kaalmada adeegyada, cruz miranda . So [facility] [phone].    ATENCIÓN: Si habla español, tiene a curtis disposición servicios gratuitos de asistencia lingüística. Tiara al [phone].    We comply with applicable federal civil rights laws and [place] laws. We do not discriminate on the basis of race, color, national origin, age, disability, sex, sexual orientation, or gender identity.            Thank you!     Thank you for choosing [facility]  for your care. Our goal is always to provide you with excellent care. Hearing back from our patients is one way we can continue to improve our services. Please take a few minutes to complete the written survey that you may receive in the mail after your visit with us. Thank you!             Your Updated Medication List - Protect others around you: Learn how to safely use, store and throw away your medicines at www.disposemymeds.org.          This list is accurate as of: 1/5/18  4:40 PM.  Always use your most  recent med list.                   Brand Name Dispense Instructions for use Diagnosis    aspirin-acetaminophen-caffeine 250-250-65 MG per tablet    EXCEDRIN MIGRAINE    90 tablet    Take 1 tablet by mouth every 8 hours as needed for headaches    Episodic tension-type headache, not intractable       etonogestrel 68 MG Impl    IMPLANON/NEXPLANON     1 each (68 mg) by Subdermal route continuous    Insertion of implantable subdermal contraceptive       gabapentin 300 MG capsule    NEURONTIN    90 capsule    Take 1 capsule (300 mg) by mouth At Bedtime    Severe major depressive disorder (H)       prazosin 1 MG capsule    MINIPRESS    90 capsule    Take 1 capsule (1 mg) by mouth At Bedtime    PTSD (post-traumatic stress disorder)       propranolol 10 MG tablet    INDERAL    120 tablet    Take 1 tablet (10 mg) by mouth 2 times daily as needed    Severe major depressive disorder (H)       sertraline 100 MG tablet    ZOLOFT    90 tablet    Take 1 tablet (100 mg) by mouth daily    Severe major depressive disorder (H)

## 2018-01-05 NOTE — PROGRESS NOTES
Preceptor attestation:  Patient seen and discussed with the resident. Assessment and plan reviewed with resident and agreed upon.  Supervising physician: Joel Gupta  [facility]

## 2018-01-05 NOTE — NURSING NOTE
name: Paresh Chavez  Language: Perri  Agency: [facility]  Phone number: [phone]

## 2018-01-05 NOTE — PROGRESS NOTES
[address] [address]  [phone]         SUBJECTIVE       Agatha Rosales is a 22 year old  female with a PMH significant for:     Patient Active Problem List   Diagnosis     Language barrier, cultural differences     History of Helicobacter pylori infection     Bilateral low back pain without sciatica     Dermatitis     Severe major depressive disorder (H)     Other personal history of psychological trauma, not elsewhere classified     High level of expressed emotion within family     PTSD (post-traumatic stress disorder)     Nexplanon in place     She presents with depression follow up:    -Depression and Anxiety: Overall her mood feels about the same, but she has had fewer nightmares since starting prazosin. She is tolerating it and does not report any side effects. She has no thoughts of self-harm or harm to her children. She is now interested in therapy again but she would be interested in therapy at home because she has young children at home to care for. She feels her mood is down because she is having a reoccurance of her headache she had last month. It is bilateral and the top of her headache and achy and lasts for a few hours without any provocative or palliative factors. Ibuprofen and tylenol have not helped. She accidentally had hot oil splashed on her forehead last week, but no other head trauma. No vision changes, syncope, focal weakness, nausea or vomiting. She is breastfeeding.    -Breast lump: She missed the ultrasound appointment due to a ride issue. The lump and pain has now resolved.    Past Medical History:  Past Medical History:   Diagnosis Date     History of blood transfusion 2011       Past Surgical History:  Past Surgical History:   Procedure Laterality Date     NO HISTORY OF SURGERY         Family History:  Family History   Problem Relation Age of Onset     DIABETES No family hx of      Coronary Artery Disease No family hx of      CANCER No family hx of       Hypertension No family hx of      HEART DISEASE No family hx of        Social History:  Social History     Social History     Marital status:      Spouse name: N/A     Number of children: N/A     Years of education: N/A     Occupational History     student      Social History Main Topics     Smoking status: Never Smoker     Smokeless tobacco: Never Used     Alcohol use No     Drug use: No     Sexual activity: Yes     Partners: Male     Other Topics Concern     Not on file     Social History Narrative       Medications:   Current Outpatient Prescriptions   Medication Sig Dispense Refill     aspirin-acetaminophen-caffeine (EXCEDRIN MIGRAINE) 250-250-65 MG per tablet Take 1 tablet by mouth every 8 hours as needed for headaches 90 tablet 3     prazosin (MINIPRESS) 1 MG capsule Take 1 capsule (1 mg) by mouth At Bedtime 90 capsule 3     gabapentin (NEURONTIN) 300 MG capsule Take 1 capsule (300 mg) by mouth At Bedtime 90 capsule 3     sertraline (ZOLOFT) 100 MG tablet Take 1 tablet (100 mg) by mouth daily 90 tablet 3     propranolol (INDERAL) 10 MG tablet Take 1 tablet (10 mg) by mouth 2 times daily as needed 120 tablet 3     etonogestrel (IMPLANON/NEXPLANON) 68 MG IMPL 1 each (68 mg) by Subdermal route continuous  0       Allergies:   No Known Allergies    PMH, Surgical Hx, Family Hx, Social Hx, Medications and Allergies were reviewed and updated as needed in Epic.        REVIEW OF SYSTEMS     Please see HPI        OBJECTIVE     Vitals:    01/05/18 1617   BP: 97/61   Pulse: 73   Resp: 24   Temp: 98.3  F (36.8  C)   TempSrc: Oral   SpO2: 99%   Weight: 99 lb 6.4 oz (45.1 kg)     Body mass index is 20.58 kg/(m^2).    Constitutional: Awake, alert, cooperative, young Perri female in no apparent distress, and appears stated age. Seen with her young daughter and a professional Perri .   Head: Healing 0.5 cm x 1.5 cm scar on right forehead without surrounding erythema, fluctuance, or drainage.  Eyes: Lids  and lashes normal, pupils equal, round and reactive to light, extra ocular muscles intact, sclera clear, conjunctiva normal.  ENT: Normocephalic, without obvious abnormality, atraumatic.  Lungs: No increased work of breathing, good air exchange, clear to auscultation bilaterally, no crackles or wheezing.  Cardiovascular: Regular rate and rhythm, normal S1 and S2, no S3 or S4, and no murmur noted.  Neurologic: Awake, alert, oriented to name, place and time.  Cranial nerves II-XII are grossly intact.  Motor is 5 out of 5 bilaterally. Sensation, reflexes and gait normal.  Neuropsychiatric: Flat affect. Sad to normal mood. Fair orientation, memory and insight.    PHQ-9 SCORE 10/20/2017 12/8/2017 1/5/2018   Total Score 9 12 10         ASSESSMENT AND PLAN     Agatha Rosales is a 22 year old Perri female with a PMH significant for nexplanon, MDD, and anxiety in place who presents for mood follow up.    1. Episodic tension-type headache, not intractable: No focal neurological deficits or concerning symptoms. Suspect tension headache and has not had relief with ibuprofen or tylenol, so will trial excedrin.  -     aspirin-acetaminophen-caffeine (EXCEDRIN MIGRAINE) 250-250-65 MG per tablet; Take 1 tablet by mouth every 8 hours as needed for headaches    2. PTSD (post-traumatic stress disorder), Anxiety, and MDD: Contracts for safety. Decreased nightmares with prazosin. Will continue gabapentin, sertraline, prazosin, and prn propranolol without change. Now interested in trying therapy again.   -     MENTAL HEALTH REFERRAL for in house therapy. Patient has young children so it is difficult to travel to frequent appointments.     3. Lump or mass in breast: Has resolved. Missed appointment for ultrasound due to ride issue. Patient would like to continue to monitor and if it reoccurs, she will reschedule ultrasound.    RTC in 3 months for follow up of mood or sooner if develops new or worsening symptoms.    Options for treatment  and/or follow-up care were reviewed with the patient who was engaged and actively involved in the decision making process and verbalized understanding of the options discussed and was satisfied with the final plan.    Prema Trotter MD PGY-3  [facility]  Pager: [phone]    Patient discussed with Dr. Joel Gupta, attending physician, who agrees with the plan.

## 2018-01-05 NOTE — PATIENT INSTRUCTIONS
-We will refer you to therapy. Someone will call to to schedule. Let us know if you have not heard in a week  -Start taking Excedrin migraine every 8 hours as needed for headaches. Drink lots of fluids  -No changes to your other medications.   -Let us know if the breast pain returns and we can reschedule the ultrasound    A message has been sent to the behavioral health team to advise for mental health referral. See documentation encounter for details.  Shruthi  01/08/18

## 2018-01-08 ENCOUNTER — DOCUMENTATION ONLY (OUTPATIENT)
Dept: PSYCHOLOGY | Facility: CLINIC | Age: 23
End: 2018-01-08

## 2018-01-08 NOTE — PROGRESS NOTES
Behavioral Health Team,    Patient is being referred for mental health services. Please advise if we are able to see patient for in house treatment or if a community option would be best. Please review previous mental health referral documentation encounter for details on barriers from the previous referral. I'd be glad to reach out to her again to touch base on her thoughts of starting therapy and preference in home vs clinic visit to accommodate for tricky schedule.     Thank you.     Shruthi  Care Coordinator

## 2018-01-10 NOTE — PROGRESS NOTES
I have reached out to [facility] and they state that patient has not been seen since June. At that point patient informed them that she was also having in home services with [facility]. It was at this point that they then discontinued services with her. Currently they do not have the capacity to enroll her again for this. I then called [facility] and left a message to find out if patient is currently enrolled with them as a patient. I will update the note once I hear back from them.   Shruthi  01/10/18

## 2018-01-10 NOTE — PROGRESS NOTES
Per review of Dr. Trotter's note Ms. Rosales prefers in-home therapy.  In the past, this was available via her therapist at [facility], but not sure what the current status of that may be.      Shruthi - could you do an outreach call to [facility] to see if Ms. Rosales may be able to receive in-home therapy from them?  If not, I am wondering if [facility] or [facility] may provide in-home services?  Finally, we could consider referral for ARM via [facility] if the others are not an option.  Let me know if you have questions or if you need additional follow up from me.  Thanks!  Lily Storey, Ph.D.,[place] [id number]  [phone]    MORE [address] [place] [address]  Main Phone: [phone]  Fax: [phone]  [facility] / [phone]    [facility], [address], [address]  [place], [place]   [address]  Office:   [phone]  Fax:   [phone]

## 2018-02-07 NOTE — PROGRESS NOTES
Called and followed up with [facility].  Left a message for Nargis to get clarification on if patient is getting services.  Shruthi  02/07/18

## 2018-04-20 ENCOUNTER — OFFICE VISIT (OUTPATIENT)
Dept: FAMILY MEDICINE | Facility: CLINIC | Age: 23
End: 2018-04-20
Payer: COMMERCIAL

## 2018-04-20 VITALS
TEMPERATURE: 99 F | OXYGEN SATURATION: 98 % | WEIGHT: 98 LBS | DIASTOLIC BLOOD PRESSURE: 59 MMHG | SYSTOLIC BLOOD PRESSURE: 91 MMHG | HEART RATE: 87 BPM | BODY MASS INDEX: 20.29 KG/M2

## 2018-04-20 DIAGNOSIS — R53.83 OTHER FATIGUE: ICD-10-CM

## 2018-04-20 DIAGNOSIS — F32.2 SEVERE MAJOR DEPRESSIVE DISORDER (H): ICD-10-CM

## 2018-04-20 DIAGNOSIS — G47.00 INSOMNIA, UNSPECIFIED TYPE: Primary | ICD-10-CM

## 2018-04-20 LAB
ANION GAP SERPL CALCULATED.3IONS-SCNC: 12 MMOL/L (ref 5–18)
BUN SERPL-MCNC: 14 MG/DL (ref 8–22)
CALCIUM SERPL-MCNC: 9.6 MG/DL (ref 8.5–10.5)
CHLORIDE SERPL-SCNC: 107 MMOL/L (ref 98–107)
CO2 SERPL-SCNC: 19 MMOL/L (ref 22–31)
CREAT SERPL-MCNC: 0.72 MG/DL (ref 0.6–1.1)
ERYTHROCYTE [DISTWIDTH] IN BLOOD BY AUTOMATED COUNT: 12 % (ref 11–14.5)
GLUCOSE SERPL-MCNC: 73 MG/DL (ref 70–125)
HCT VFR BLD AUTO: 42.9 % (ref 35–47)
HGB BLD-MCNC: 14 G/DL (ref 12–16)
MCH RBC QN AUTO: 29.5 PG (ref 27–34)
MCHC RBC AUTO-ENTMCNC: 32.6 G/DL (ref 32–36)
MCV RBC AUTO: 91 FL (ref 80–100)
PLATELET # BLD AUTO: 193 THOU/UL (ref 140–440)
PMV BLD AUTO: 12.3 FL (ref 8.5–12.5)
POTASSIUM SERPL-SCNC: 3.7 MMOL/L (ref 3.5–5)
RBC # BLD AUTO: 4.74 MILL/UL (ref 3.8–5.4)
SODIUM SERPL-SCNC: 138 MMOL/L (ref 136–145)
TSH SERPL DL<=0.05 MIU/L-ACNC: 1.8 UIU/ML (ref 0.3–5)
WBC # BLD AUTO: 7.8 THOU/UL (ref 4–11)

## 2018-04-20 RX ORDER — HYDROXYZINE HYDROCHLORIDE 25 MG/1
25 TABLET, FILM COATED ORAL
Qty: 90 TABLET | Refills: 1 | Status: SHIPPED | OUTPATIENT
Start: 2018-04-20 | End: 2018-04-21

## 2018-04-20 RX ORDER — SERTRALINE HYDROCHLORIDE 100 MG/1
100 TABLET, FILM COATED ORAL DAILY
Qty: 90 TABLET | Refills: 3 | Status: SHIPPED | OUTPATIENT
Start: 2018-04-20 | End: 2018-06-26

## 2018-04-20 NOTE — LETTER
April 23, 2018      Agatha Rosales  [address] [place] [address] 2  [place] [address]        Dear Agatha,    Thank you for coming to [facility]! All of your labs were normal, which is good!     Sincerely,     Prema Trotter MD     Please see below for your test results.    Resulted Orders   Thyroid [place] ([facility])   Result Value Ref Range    TSH 1.80 0.30 - 5.00 uIU/mL    Narrative    Test performed by:  [facility]  [address] [place] [address]   CBC w/ Plt. ([facility])   Result Value Ref Range    WBC 7.8 4.0 - 11.0 thou/uL    RBC 4.74 3.80 - 5.40 mill/uL    Hemoglobin 14.0 12.0 - 16.0 g/dL    Hematocrit 42.9 35.0 - 47.0 %    MCV 91 80 - 100 fL    MCH 29.5 27.0 - 34.0 pg    MCHC 32.6 32.0 - 36.0 g/dL    RDW 12.0 11.0 - 14.5 %    Platelets 193 140 - 440 thou/uL    Mean Platelet Volume 12.3 8.5 - 12.5 fL    Narrative    Test performed by:  [facility]  [address] [place] [address]   Basic Metabolic Profile ([facility])   Result Value Ref Range    Sodium 138 136 - 145 mmol/L    Potassium 3.7 3.5 - 5.0 mmol/L    Chloride 107 98 - 107 mmol/L    CO2, Total 19 (L) 22 - 31 mmol/L    Anion Gap 12 5 - 18 mmol/L    Glucose 73 70 - 125 mg/dL    Calcium 9.6 8.5 - 10.5 mg/dL    Urea Nitrogen 14 8 - 22 mg/dL    Creatinine 0.72 0.60 - 1.10 mg/dL    GFR Estimate If Black >60 >60 mL/min/1.73m2    GFR Estimate >60 >60 mL/min/1.73m2    Narrative    Test performed by:  [facility]  [address] [place] [address]  Fasting Glucose reference range is 70-99 mg/dL per  American Diabetes Association (ADA) guidelines.       If you have any questions, please call the clinic to make an appointment.    Sincerely,    Prema Trotter MD

## 2018-04-20 NOTE — PROGRESS NOTES
Preceptor Attestation:   Patient seen, evaluated and discussed with the resident. I have verified the content of the note, which accurately reflects my assessment of the patient and the plan of care.   Supervising Physician:  Benji Ross MD

## 2018-04-20 NOTE — PROGRESS NOTES
[address] [address]  [phone]         SUBJECTIVE       Agatha Rosales is a 22 year old  female with a PMH significant for:     Patient Active Problem List   Diagnosis     Language barrier, cultural differences     History of Helicobacter pylori infection     Bilateral low back pain without sciatica     Dermatitis     Severe major depressive disorder (H)     Other personal history of psychological trauma, not elsewhere classified     High level of expressed emotion within family     PTSD (post-traumatic stress disorder)     Nexplanon in place     She presents with mood follow up.    She has been having 4 days of on and off chills and hot flashes. No cough, trouble breathing, or runny nose. She does feel tired. She feels some dizziness as well, but no loss of consciousness. Soemtimes she feels sick, she feels she is better off dead because she is not feeling well but no attempts or thoughts of hurting her kids. For her mood, there are some good days and some bad days.She had been on hydroxyzine prn in the past for sleep and this was helpful and she would like to try this again. Her main concern is poor sleep. She does not want to change her other medications. She reports she has in home therapy with [facility] which is going well. Her youngest child with intermittently breastfeed for comfort but she plans on stopping breastfeeding.    Past Medical History:  Past Medical History:   Diagnosis Date     History of blood transfusion 2011       Past Surgical History:  Past Surgical History:   Procedure Laterality Date     NO HISTORY OF SURGERY         Family History:  Family History   Problem Relation Age of Onset     DIABETES No family hx of      Coronary Artery Disease No family hx of      CANCER No family hx of      Hypertension No family hx of      HEART DISEASE No family hx of        Social History:  Social History     Social History     Marital status:      Spouse name: N/A      Number of children: N/A     Years of education: N/A     Occupational History     student      Social History Main Topics     Smoking status: Never Smoker     Smokeless tobacco: Never Used     Alcohol use No     Drug use: No     Sexual activity: Yes     Partners: Male     Other Topics Concern     Not on file     Social History Narrative       Medications:   Current Outpatient Prescriptions   Medication Sig Dispense Refill     aspirin-acetaminophen-caffeine (EXCEDRIN MIGRAINE) 250-250-65 MG per tablet Take 1 tablet by mouth every 8 hours as needed for headaches 90 tablet 3     etonogestrel (IMPLANON/NEXPLANON) 68 MG IMPL 1 each (68 mg) by Subdermal route continuous  0     gabapentin (NEURONTIN) 300 MG capsule Take 1 capsule (300 mg) by mouth At Bedtime 90 capsule 3     hydrOXYzine (ATARAX) 25 MG tablet Take 1 tablet (25 mg) by mouth nightly as needed for itching 90 tablet 1     IBUPROFEN PO Take 400 mg by mouth every 8 hours as needed for moderate pain       prazosin (MINIPRESS) 1 MG capsule Take 1 capsule (1 mg) by mouth At Bedtime 90 capsule 3     propranolol (INDERAL) 10 MG tablet Take 1 tablet (10 mg) by mouth 2 times daily as needed 120 tablet 3     sertraline (ZOLOFT) 100 MG tablet Take 1 tablet (100 mg) by mouth daily 90 tablet 3       Allergies:   No Known Allergies    PMH, Surgical Hx, Family Hx, Social Hx, Medications and Allergies were reviewed and updated as needed in Epic.        REVIEW OF SYSTEMS     Complete ROS negative except as above        OBJECTIVE     Vitals:    04/20/18 1639 04/20/18 1640   BP: (!) 89/54 91/59   Pulse: 87    Temp: 99  F (37.2  C)    TempSrc: Oral    SpO2: 98%    Weight: 98 lb (44.5 kg)      Body mass index is 20.29 kg/(m^2).    Constitutional: Awake, alert, cooperative, young Perri female in no apparent distress, and appears stated age. Seen with her young daughter and a professional Perri .   Eyes: Lids and lashes normal, pupils equal, round and reactive to light,  extra ocular muscles intact, sclera clear, conjunctiva normal.  ENT: Normocephalic, without obvious abnormality, atraumatic.  Lungs: No increased work of breathing, good air exchange, clear to auscultation bilaterally, no crackles or wheezing.  Cardiovascular: Regular rate and rhythm, normal S1 and S2, no S3 or S4, and no murmur noted.  Neurologic: Awake, alert, oriented to name, place and time.  Cranial nerves II-XII are grossly intact.  Motor is 5 out of 5 bilaterally. Sensation, reflexes and gait normal.  Neuropsychiatric: Flat affect. Sad to normal mood. Fair orientation, memory and insight.    PHQ-9 SCORE 12/8/2017 1/5/2018 4/20/2018   Total Score 12 10 19       ASSESSMENT AND PLAN     Agatha Rosales is a 22 year old [place]ri female with a PMH significant for nexplanon, MDD, and anxiety in place who presents for mood follow up.    1. PTSD (post-traumatic stress disorder), Anxiety, and MDD: Contracts for safety. Decreased nightmares with prazosin but having insomnia and would like to trial hydroxyzine. Will continue gabapentin, sertraline, prazosin, and prn propranolol without change as patient is comfortable at these doses.  -Restart low dose hydroxyzine 25 mg prn for insomnia. She is planning on stopping breastfeeding her young daughter who intermittently will briefly breastfeed for comfort. Advised to only take hydrozyine as night, avoid breastfeeding for several hours and monitor for any signs of drowsiness  -PAGE obtained for [facility]    2. Other fatigue:  Normal exam, slightly low BP but close to baseline. Advised to stay well hydrated. Will check basic labwork to work up common causes such as anemia and thryoid issues. May also be related to poor sleep from insomnia as above. Return precautions reviewed.  -     Thyroid [place] ([facility])  -     CBC w/ Plt. ([facility])  -     Basic Metabolic Profile ([facility])      RTC in 1 months for follow up of mood or sooner if develops new or worsening  symptoms.    Options for treatment and/or follow-up care were reviewed with the patient who was engaged and actively involved in the decision making process and verbalized understanding of the options discussed and was satisfied with the final plan.    Prema Trotter MD PGY-3  [facility]  Pager: [phone]    Patient discussed with Dr. Benji Ross, attending physician, who agrees with the plan.

## 2018-04-20 NOTE — PATIENT INSTRUCTIONS
-Bloodwork to look for causes of fatigue. We will send a letter with a results  -Hydroxyzine as night as needed for sleep. Do not breastfeed for several hours after  -No other changes to medications

## 2018-04-20 NOTE — MR AVS SNAPSHOT
After Visit Summary   2018    Agatha Rosales    MRN: [id number]           Patient Information     Date Of Birth          1995        Visit Information        Provider Department      2018 4:30 PM Prema Trotter MD [facility]        Today's Diagnoses     Insomnia, unspecified type    -  1    Severe major depressive disorder (H)        Other fatigue          Care Instructions    -Bloodwork to look for causes of fatigue. We will send a letter with a results  -Hydroxyzine as night as needed for sleep. Do not breastfeed for several hours after  -No other changes to medications          Follow-ups after your visit        Follow-up notes from your care team     Return in about 4 weeks (around 2018) for Mood Follow Up.      Who to contact     Please call your clinic at [phone] to:    Ask questions about your health    Make or cancel appointments    Discuss your medicines    Learn about your test results    Speak to your doctor            Additional Information About Your Visit        MyChart Information     Veebeam is an electronic gateway that provides easy, online access to your medical records. With Veebeam, you can request a clinic appointment, read your test results, renew a prescription or communicate with your care team.     To sign up for [employer] visit the website at [email or web address]   You will be asked to enter the access code listed below, as well as some personal information. Please follow the directions to create your username and password.     Your access code is: [email or web address]  Expires: 2018  4:54 PM     Your access code will  in 90 days. If you need help or a new code, please contact your [facility] or call [phone] for assistance.        Care EveryWhere ID     This is your Care EveryWhere ID. This could be used by other organizations to access your [place] medical records  [id number]        Your  Vitals Were     Pulse Temperature Pulse Oximetry BMI (Body Mass Index)          87 99  F (37.2  C) (Oral) 98% 20.29 kg/m2         Blood Pressure from Last 3 Encounters:   04/20/18 91/59   01/05/18 97/61   12/08/17 91/59    Weight from Last 3 Encounters:   04/20/18 98 lb (44.5 kg)   01/05/18 99 lb 6.4 oz (45.1 kg)   12/08/17 99 lb (44.9 kg)              We Performed the Following     Basic Metabolic Panel ([facility])     CBC w/ Plt. ([facility])     Thyroid [place] ([facility])          Today's Medication Changes          These changes are accurate as of 4/20/18  4:54 PM.  If you have any questions, ask your nurse or doctor.               Start taking these medicines.        Dose/Directions    hydrOXYzine 25 MG tablet   Commonly known as:  ATARAX   Used for:  Severe major depressive disorder (H), Insomnia, unspecified type   Started by:  Prema Trotter MD        Dose:  25 mg   Take 1 tablet (25 mg) by mouth nightly as needed for itching   Quantity:  90 tablet   Refills:  1            Where to get your medicines      These medications were sent to [facility] - [place], [place] - [address], [place] [address]     Phone:  [phone]     hydrOXYzine 25 MG tablet    sertraline 100 MG tablet                Primary Care Provider Office Phone # Fax #    Amanda Sharon Dimas [phone]       [facility]  [address] [place] [address]        Equal Access to Services     CORAZON LAURA AH: Hadii jasson ku hadasho Soomaali, waaxda luqadaha, qaybta kaalmada adeegyada, waxay ruthie lopez. So [facility] [phone].    ATENCIÓN: Si bongla raman, tiene a curtis disposición servicios gratuitos de asistencia lingüística. Llame al [phone].    We comply with applicable federal civil rights laws and [place] laws. We do not discriminate on the basis of race, color, national origin, age, disability, sex, sexual orientation, or gender  identity.            Thank you!     Thank you for choosing [facility]  for your care. Our goal is always to provide you with excellent care. Hearing back from our patients is one way we can continue to improve our services. Please take a few minutes to complete the written survey that you may receive in the mail after your visit with us. Thank you!             Your Updated Medication List - Protect others around you: Learn how to safely use, store and throw away your medicines at www.disposemymeds.org.          This list is accurate as of 4/20/18  4:54 PM.  Always use your most recent med list.                   Brand Name Dispense Instructions for use Diagnosis    aspirin-acetaminophen-caffeine 250-250-65 MG per tablet    EXCEDRIN MIGRAINE    90 tablet    Take 1 tablet by mouth every 8 hours as needed for headaches    Episodic tension-type headache, not intractable       etonogestrel 68 MG Impl    IMPLANON/NEXPLANON     1 each (68 mg) by Subdermal route continuous    Insertion of implantable subdermal contraceptive       gabapentin 300 MG capsule    NEURONTIN    90 capsule    Take 1 capsule (300 mg) by mouth At Bedtime    Severe major depressive disorder (H)       hydrOXYzine 25 MG tablet    ATARAX    90 tablet    Take 1 tablet (25 mg) by mouth nightly as needed for itching    Severe major depressive disorder (H), Insomnia, unspecified type       IBUPROFEN PO      Take 400 mg by mouth every 8 hours as needed for moderate pain        prazosin 1 MG capsule    MINIPRESS    90 capsule    Take 1 capsule (1 mg) by mouth At Bedtime    PTSD (post-traumatic stress disorder)       propranolol 10 MG tablet    INDERAL    120 tablet    Take 1 tablet (10 mg) by mouth 2 times daily as needed    Severe major depressive disorder (H)       sertraline 100 MG tablet    ZOLOFT    90 tablet    Take 1 tablet (100 mg) by mouth daily    Severe major depressive disorder (H)

## 2018-04-21 RX ORDER — HYDROXYZINE HYDROCHLORIDE 25 MG/1
25 TABLET, FILM COATED ORAL
Qty: 90 TABLET | Refills: 1 | Status: SHIPPED | OUTPATIENT
Start: 2018-04-21 | End: 2018-05-22

## 2018-04-21 ASSESSMENT — PATIENT HEALTH QUESTIONNAIRE - PHQ9: SUM OF ALL RESPONSES TO PHQ QUESTIONS 1-9: 19

## 2018-04-21 NOTE — PROGRESS NOTES
Please send results letter to patient, thanks!    Dear Ms. Rosales,    Thank you for coming to [facility]! All of your labs were normal, which is good!    Sincerely,  Prema Trotter MD

## 2018-05-21 NOTE — NURSING NOTE
Due to patient being non-English speaking/uses sign language, an  was used for this visit. Only for face-to-face interpretation by an external agency, date and length of interpretation can be found on the scanned worksheet.     name: Cammy Reyes  Agency: [employer]  Language: Perri   Telephone number: [phone] (Main [employer] Phone)  Type of interpretation: Face-to-face, spoken

## 2018-05-22 ENCOUNTER — OFFICE VISIT (OUTPATIENT)
Dept: FAMILY MEDICINE | Facility: CLINIC | Age: 23
End: 2018-05-22
Payer: COMMERCIAL

## 2018-05-22 VITALS
WEIGHT: 99 LBS | TEMPERATURE: 97.8 F | SYSTOLIC BLOOD PRESSURE: 91 MMHG | DIASTOLIC BLOOD PRESSURE: 57 MMHG | OXYGEN SATURATION: 99 % | BODY MASS INDEX: 20.5 KG/M2 | HEART RATE: 83 BPM

## 2018-05-22 DIAGNOSIS — F43.10 PTSD (POST-TRAUMATIC STRESS DISORDER): ICD-10-CM

## 2018-05-22 DIAGNOSIS — F32.2 SEVERE MAJOR DEPRESSIVE DISORDER (H): Primary | ICD-10-CM

## 2018-05-22 DIAGNOSIS — K21.9 GASTROESOPHAGEAL REFLUX DISEASE WITHOUT ESOPHAGITIS: ICD-10-CM

## 2018-05-22 DIAGNOSIS — G47.00 INSOMNIA, UNSPECIFIED TYPE: ICD-10-CM

## 2018-05-22 RX ORDER — HYDROXYZINE HYDROCHLORIDE 25 MG/1
25 TABLET, FILM COATED ORAL
Qty: 90 TABLET | Refills: 1 | Status: SHIPPED | OUTPATIENT
Start: 2018-05-22 | End: 2018-06-26

## 2018-05-22 RX ORDER — PRENATAL VIT/IRON FUM/FOLIC AC 27MG-0.8MG
1 TABLET ORAL DAILY
Qty: 100 TABLET | Refills: 3 | Status: SHIPPED | OUTPATIENT
Start: 2018-05-22 | End: 2018-06-26

## 2018-05-22 ASSESSMENT — ANXIETY QUESTIONNAIRES
GAD7 TOTAL SCORE: 8
7. FEELING AFRAID AS IF SOMETHING AWFUL MIGHT HAPPEN: MORE THAN HALF THE DAYS
1. FEELING NERVOUS, ANXIOUS, OR ON EDGE: SEVERAL DAYS
5. BEING SO RESTLESS THAT IT IS HARD TO SIT STILL: NOT AT ALL
IF YOU CHECKED OFF ANY PROBLEMS ON THIS QUESTIONNAIRE, HOW DIFFICULT HAVE THESE PROBLEMS MADE IT FOR YOU TO DO YOUR WORK, TAKE CARE OF THINGS AT HOME, OR GET ALONG WITH OTHER PEOPLE: VERY DIFFICULT
2. NOT BEING ABLE TO STOP OR CONTROL WORRYING: SEVERAL DAYS
6. BECOMING EASILY ANNOYED OR IRRITABLE: MORE THAN HALF THE DAYS
3. WORRYING TOO MUCH ABOUT DIFFERENT THINGS: SEVERAL DAYS

## 2018-05-22 ASSESSMENT — PATIENT HEALTH QUESTIONNAIRE - PHQ9: 5. POOR APPETITE OR OVEREATING: SEVERAL DAYS

## 2018-05-22 NOTE — NURSING NOTE
Due to patient being non-English speaking/uses sign language, an  was used for this visit. Only for face-to-face interpretation by an external agency, date and length of interpretation can be found on the scanned worksheet.     name: Martin Webster  Agency: [employer]  Language: Perri   Telephone number: [phone]  Type of interpretation: Face-to-face, spoken

## 2018-05-22 NOTE — PROGRESS NOTES
[address] [address]  [phone]         SUBJECTIVE       Agatha Rosales is a 22 year old  female with a PMH significant for:     Patient Active Problem List   Diagnosis     Language barrier, cultural differences     History of Helicobacter pylori infection     Bilateral low back pain without sciatica     Dermatitis     Severe major depressive disorder (H)     Other personal history of psychological trauma, not elsewhere classified     High level of expressed emotion within family     PTSD (post-traumatic stress disorder)     Nexplanon in place     She presents with mood follow up.    Hydroxyzine was restarted at last visit to help with sleep, but she has not received yet so new rx was sent to pharmacy.  Continuing therapy every week. She is still on gabapentin, prazosin, propranolol and sertraline and tolerating well. She overall feels comfortable with this regimen and would like to continue without change. Her main concern is poor sleep and appetite. No thoughts of hurting herself or her kids.    She feels her appetite is fluctuating with some days feeling hungry and other days not feeling hungry. No nausea or vomiting. Regular bowel movements and no constipation. No fever or blood in stools. No travel or new foods. Has not tried any medications yet. When she eats large meals, she feels upset stomach and a weird taste in her mouth.     Past Medical History:  Past Medical History:   Diagnosis Date     History of blood transfusion 2011       Past Surgical History:  Past Surgical History:   Procedure Laterality Date     NO HISTORY OF SURGERY         Family History:  Family History   Problem Relation Age of Onset     DIABETES No family hx of      Coronary Artery Disease No family hx of      CANCER No family hx of      Hypertension No family hx of      HEART DISEASE No family hx of        Social History:  Social History     Social History     Marital status:      Spouse name:  N/A     Number of children: N/A     Years of education: N/A     Occupational History     student      Social History Main Topics     Smoking status: Never Smoker     Smokeless tobacco: Never Used     Alcohol use No     Drug use: No     Sexual activity: Yes     Partners: Male     Other Topics Concern     Not on file     Social History Narrative       Medications:   Current Outpatient Prescriptions   Medication Sig Dispense Refill     aspirin-acetaminophen-caffeine (EXCEDRIN MIGRAINE) 250-250-65 MG per tablet Take 1 tablet by mouth every 8 hours as needed for headaches 90 tablet 3     etonogestrel (IMPLANON/NEXPLANON) 68 MG IMPL 1 each (68 mg) by Subdermal route continuous  0     gabapentin (NEURONTIN) 300 MG capsule Take 1 capsule (300 mg) by mouth At Bedtime 90 capsule 3     hydrOXYzine (ATARAX) 25 MG tablet Take 1 tablet (25 mg) by mouth nightly as needed for other (sleep) 90 tablet 1     IBUPROFEN PO Take 400 mg by mouth every 8 hours as needed for moderate pain       prazosin (MINIPRESS) 1 MG capsule Take 1 capsule (1 mg) by mouth At Bedtime 90 capsule 3     Prenatal Vit-Fe Fumarate-FA (PRENATAL MULTIVITAMIN PLUS IRON) 27-0.8 MG TABS per tablet Take 1 tablet by mouth daily 100 tablet 3     propranolol (INDERAL) 10 MG tablet Take 1 tablet (10 mg) by mouth 2 times daily as needed 120 tablet 3     ranitidine (ZANTAC) 150 MG tablet Take 1 tablet (150 mg) by mouth 2 times daily 60 tablet 1     sertraline (ZOLOFT) 100 MG tablet Take 1 tablet (100 mg) by mouth daily 90 tablet 3       Allergies:   No Known Allergies    PMH, Surgical Hx, Family Hx, Social Hx, Medications and Allergies were reviewed and updated as needed in Epic.        REVIEW OF SYSTEMS     Complete ROS negative except as above        OBJECTIVE     Vitals:    05/22/18 1637   BP: 91/57   BP Location: Left arm   Patient Position: Sitting   Cuff Size: Adult Regular   Pulse: 83   Temp: 97.8  F (36.6  C)   TempSrc: Oral   SpO2: 99%   Weight: 99 lb (44.9 kg)      Body mass index is 20.5 kg/(m^2).    Constitutional: Awake, alert, cooperative, young Perri female in no apparent distress, and appears stated age. Seen with her young daughter and a professional Perri .   Lungs: No increased work of breathing, good air exchange, clear to auscultation bilaterally, no crackles or wheezing.  Cardiovascular: Regular rate and rhythm, normal S1 and S2, no S3 or S4, and no murmur noted.  Neurologic: Awake, alert, oriented to name, place and time.  Cranial nerves II-XII are grossly intact.  Motor is 5 out of 5 bilaterally. Sensation, reflexes and gait normal.   Abdomen: Mild epigastric pain with deep palpation. Bowel sound present. Soft, no guarding or rebound.  Neuropsychiatric: Flat affect. Sad to normal mood. Fair orientation, memory and insight.    PHQ-9 SCORE 1/5/2018 4/20/2018 5/22/2018   Total Score 10 19 12       ASSESSMENT AND PLAN     Agatha Rosales is a 22 year old [place]ri female with a PMH significant for nexplanon, MDD, and anxiety in place who presents for mood follow up.    1. PTSD (post-traumatic stress disorder), Anxiety, and MDD: Contracts for safety. Main symptoms of poor sleep and appetite. Will continue gabapentin, sertraline, prazosin, and prn propranolol without change as patient is comfortable at these doses.  -New rx sent for hydrozyzine as she has not received it yet  -Continue in home therapy with [facility]  -Continue gabapentin, sertraline, prazosin, and prn propranolol at same doses    2. Gastroesophageal reflux disease without esophagitis: Epigastric discomfort after large meals. Non-acute abdomen.No alarm symptoms. Possible GERD component so will start H2 blocker. If no improvement, consider H pylori testing. Will also start multivitamin given inconsistent diet. Discussed smaller, more frequent meals. Return precautions reviewed.  -     ranitidine (ZANTAC) 150 MG tablet; Take 1 tablet (150 mg) by mouth 2 times daily  -     Prenatal Vit-Fe  Fumarate-FA (PRENATAL MULTIVITAMIN PLUS IRON) 27-0.8 MG TABS per tablet; Take 1 tablet by mouth daily    RTC in 1 months for follow up of mood and abdominal pain or sooner if develops new or worsening symptoms.    Options for treatment and/or follow-up care were reviewed with the patient who was engaged and actively involved in the decision making process and verbalized understanding of the options discussed and was satisfied with the final plan.    Prema Trotter MD PGY-3  [facility]  Pager: [phone]    Patient discussed with Dr. Dominga Brown, attending physician, who agrees with the plan.

## 2018-05-22 NOTE — MR AVS SNAPSHOT
After Visit Summary   2018    Agatha Rosales    MRN: [id number]           Patient Information     Date Of Birth          1995        Visit Information        Provider Department      2018 4:30 PM Prema Trotter MD [facility]        Today's Diagnoses     Severe major depressive disorder (H)    -  1    PTSD (post-traumatic stress disorder)        Insomnia, unspecified type        Gastroesophageal reflux disease without esophagitis          Care Instructions    -No changes to medication today  -Hydroxyzine as needed for sleep at night  -Take ranitidine twice a day for a week and as needed twice a day to help with supet stomach   -Take daily prenatal vitamin                Follow-ups after your visit        Follow-up notes from your care team     Return in about 1 month (around 2018) for Appetite Follow Up.      Who to contact     Please call your clinic at [phone] to:    Ask questions about your health    Make or cancel appointments    Discuss your medicines    Learn about your test results    Speak to your doctor            Additional Information About Your Visit        MyChart Information     [employer] is an electronic gateway that provides easy, online access to your medical records. With [employer], you can request a clinic appointment, read your test results, renew a prescription or communicate with your care team.     To sign up for [employer] visit the website at [email or web address]   You will be asked to enter the access code listed below, as well as some personal information. Please follow the directions to create your username and password.     Your access code is: [email or web address]  Expires: 2018  4:54 PM     Your access code will  in 90 days. If you need help or a new code, please contact your [facility] or call [phone] for assistance.        Care EveryWhere ID     This is your Care EveryWhere ID. This could be  used by other organizations to access your [facility] medical records  [id number]        Your Vitals Were     Pulse Temperature Pulse Oximetry BMI (Body Mass Index)          83 97.8  F (36.6  C) (Oral) 99% 20.5 kg/m2         Blood Pressure from Last 3 Encounters:   05/22/18 91/57   04/20/18 91/59   01/05/18 97/61    Weight from Last 3 Encounters:   05/22/18 99 lb (44.9 kg)   04/20/18 98 lb (44.5 kg)   01/05/18 99 lb 6.4 oz (45.1 kg)              Today, you had the following     No orders found for display         Today's Medication Changes          These changes are accurate as of 5/22/18  4:55 PM.  If you have any questions, ask your nurse or doctor.               Start taking these medicines.        Dose/Directions    prenatal multivitamin plus iron 27-0.8 MG Tabs per tablet   Used for:  Gastroesophageal reflux disease without esophagitis   Started by:  Prema Trotter MD        Dose:  1 tablet   Take 1 tablet by mouth daily   Quantity:  100 tablet   Refills:  3       ranitidine 150 MG tablet   Commonly known as:  ZANTAC   Used for:  Gastroesophageal reflux disease without esophagitis   Started by:  Prema Trotter MD        Dose:  150 mg   Take 1 tablet (150 mg) by mouth 2 times daily   Quantity:  60 tablet   Refills:  1            Where to get your medicines      These medications were sent to [facility] - [place], [place] - [address], [place] [address]     Phone:  [phone]     hydrOXYzine 25 MG tablet    prenatal multivitamin plus iron 27-0.8 MG Tabs per tablet    ranitidine 150 MG tablet                Primary Care Provider Office Phone # Fax #    Amanda Dimas [phone]       [facility]  [address] [place] [address]        Equal Access to Services     CORAZON LAURA AH: Kevin Rodriguez, wacely lugeoffrey, qaybta kaalmada gerson, cruz lopez. So [facility]  [phone].    ATENCIÓN: Si lavern camargo, tiene a curtis disposición servicios gratuitos de asistencia lingüística. Tiara ackerman [phone].    We comply with applicable federal civil rights laws and [place] laws. We do not discriminate on the basis of race, color, national origin, age, disability, sex, sexual orientation, or gender identity.            Thank you!     Thank you for choosing [facility]  for your care. Our goal is always to provide you with excellent care. Hearing back from our patients is one way we can continue to improve our services. Please take a few minutes to complete the written survey that you may receive in the mail after your visit with us. Thank you!             Your Updated Medication List - Protect others around you: Learn how to safely use, store and throw away your medicines at www.disposemymeds.org.          This list is accurate as of 5/22/18  4:55 PM.  Always use your most recent med list.                   Brand Name Dispense Instructions for use Diagnosis    aspirin-acetaminophen-caffeine 250-250-65 MG per tablet    EXCEDRIN MIGRAINE    90 tablet    Take 1 tablet by mouth every 8 hours as needed for headaches    Episodic tension-type headache, not intractable       etonogestrel 68 MG Impl    IMPLANON/NEXPLANON     1 each (68 mg) by Subdermal route continuous    Insertion of implantable subdermal contraceptive       gabapentin 300 MG capsule    NEURONTIN    90 capsule    Take 1 capsule (300 mg) by mouth At Bedtime    Severe major depressive disorder (H)       hydrOXYzine 25 MG tablet    ATARAX    90 tablet    Take 1 tablet (25 mg) by mouth nightly as needed for other (sleep)    Severe major depressive disorder (H), Insomnia, unspecified type       IBUPROFEN PO      Take 400 mg by mouth every 8 hours as needed for moderate pain        prazosin 1 MG capsule    MINIPRESS    90 capsule    Take 1 capsule (1 mg) by mouth At Bedtime    PTSD (post-traumatic stress disorder)        prenatal multivitamin plus iron 27-0.8 MG Tabs per tablet     100 tablet    Take 1 tablet by mouth daily    Gastroesophageal reflux disease without esophagitis       propranolol 10 MG tablet    INDERAL    120 tablet    Take 1 tablet (10 mg) by mouth 2 times daily as needed    Severe major depressive disorder (H)       ranitidine 150 MG tablet    ZANTAC    60 tablet    Take 1 tablet (150 mg) by mouth 2 times daily    Gastroesophageal reflux disease without esophagitis       sertraline 100 MG tablet    ZOLOFT    90 tablet    Take 1 tablet (100 mg) by mouth daily    Severe major depressive disorder (H)

## 2018-05-22 NOTE — PATIENT INSTRUCTIONS
-No changes to medication today  -Hydroxyzine as needed for sleep at night  -Take ranitidine twice a day for a week and as needed twice a day to help with supet stomach   -Take daily prenatal vitamin

## 2018-05-23 ASSESSMENT — ANXIETY QUESTIONNAIRES: GAD7 TOTAL SCORE: 8

## 2018-05-23 ASSESSMENT — PATIENT HEALTH QUESTIONNAIRE - PHQ9: SUM OF ALL RESPONSES TO PHQ QUESTIONS 1-9: 12

## 2018-05-23 NOTE — PROGRESS NOTES
Preceptor Attestation:   Patient seen, evaluated and discussed with the resident. I have verified the content of the note, which accurately reflects my assessment of the patient and the plan of care.   Supervising Physician:  Dominga Brown MD

## 2018-06-26 ENCOUNTER — OFFICE VISIT (OUTPATIENT)
Dept: FAMILY MEDICINE | Facility: CLINIC | Age: 23
End: 2018-06-26
Payer: COMMERCIAL

## 2018-06-26 VITALS
HEART RATE: 64 BPM | OXYGEN SATURATION: 100 % | SYSTOLIC BLOOD PRESSURE: 103 MMHG | HEIGHT: 59 IN | BODY MASS INDEX: 19.84 KG/M2 | DIASTOLIC BLOOD PRESSURE: 68 MMHG | WEIGHT: 98.4 LBS | TEMPERATURE: 98.2 F

## 2018-06-26 DIAGNOSIS — F32.2 SEVERE MAJOR DEPRESSIVE DISORDER (H): ICD-10-CM

## 2018-06-26 DIAGNOSIS — F43.10 PTSD (POST-TRAUMATIC STRESS DISORDER): ICD-10-CM

## 2018-06-26 DIAGNOSIS — K21.9 GASTROESOPHAGEAL REFLUX DISEASE WITHOUT ESOPHAGITIS: ICD-10-CM

## 2018-06-26 DIAGNOSIS — G47.00 INSOMNIA, UNSPECIFIED TYPE: ICD-10-CM

## 2018-06-26 RX ORDER — SERTRALINE HYDROCHLORIDE 100 MG/1
100 TABLET, FILM COATED ORAL DAILY
Qty: 90 TABLET | Refills: 3 | Status: SHIPPED | OUTPATIENT
Start: 2018-06-26

## 2018-06-26 RX ORDER — PROPRANOLOL HYDROCHLORIDE 10 MG/1
10 TABLET ORAL 2 TIMES DAILY PRN
Qty: 120 TABLET | Refills: 3 | Status: SHIPPED | OUTPATIENT
Start: 2018-06-26

## 2018-06-26 RX ORDER — PRAZOSIN HYDROCHLORIDE 1 MG/1
1 CAPSULE ORAL AT BEDTIME
Qty: 90 CAPSULE | Refills: 3 | Status: SHIPPED | OUTPATIENT
Start: 2018-06-26

## 2018-06-26 RX ORDER — HYDROXYZINE HYDROCHLORIDE 25 MG/1
25 TABLET, FILM COATED ORAL
Qty: 90 TABLET | Refills: 3 | Status: SHIPPED | OUTPATIENT
Start: 2018-06-26

## 2018-06-26 RX ORDER — GABAPENTIN 300 MG/1
300 CAPSULE ORAL AT BEDTIME
Qty: 90 CAPSULE | Refills: 3 | Status: SHIPPED | OUTPATIENT
Start: 2018-06-26

## 2018-06-26 RX ORDER — PRENATAL VIT/IRON FUM/FOLIC AC 27MG-0.8MG
1 TABLET ORAL DAILY
Qty: 100 TABLET | Refills: 3 | Status: SHIPPED | OUTPATIENT
Start: 2018-06-26

## 2018-06-26 NOTE — MR AVS SNAPSHOT
After Visit Summary   2018    Agatha Rosales    MRN: [id number]           Patient Information     Date Of Birth          1995        Visit Information        Provider Department      2018 4:10 PM Prema Trotter MD [facility]        Today's Diagnoses     PTSD (post-traumatic stress disorder)        Severe major depressive disorder (H)        Insomnia, unspecified type        Gastroesophageal reflux disease without esophagitis          Care Instructions      -No changes to medications    -Take daily vitamin    Your new doctor is Dr. Sue Rogers                  Follow-ups after your visit        Additional Services     HOME CARE REFERRAL       Patient name: Agatha Rosales  : 1995  Phone #: [phone] (home)    needed: Yes  Language: Perri    Patient prefers to be called    Skilled nursing  services: Medication Set Up                  Follow-up notes from your care team     Return in about 2 months (around 2018) for Mood Follow Up.      Future tests that were ordered for you today     Open Future Orders        Priority Expected Expires Ordered    HOME CARE REFERRAL Routine  2019            Who to contact     Please call your clinic at [phone] to:    Ask questions about your health    Make or cancel appointments    Discuss your medicines    Learn about your test results    Speak to your doctor            Additional Information About Your Visit        MyChart Information     [employer] is an electronic gateway that provides easy, online access to your medical records. With [employer], you can request a clinic appointment, read your test results, renew a prescription or communicate with your care team.     To sign up for [email or web address] visit the website at [email or web address]   You will be asked to enter the access code listed below, as well as some personal information. Please follow the directions to create your username  "and password.     Your access code is: [email or web address]  Expires: 2018  4:54 PM     Your access code will  in 90 days. If you need help or a new code, please contact your [facility] or call [phone] for assistance.        Care EveryWhere ID     This is your Care EveryWhere ID. This could be used by other organizations to access your [facility] medical records  [id number]        Your Vitals Were     Pulse Temperature Height Last Period Pulse Oximetry BMI (Body Mass Index)    64 98.2  F (36.8  C) (Oral) 4' 10.6\" (148.8 cm) 2018 (Approximate) 100% 20.15 kg/m2       Blood Pressure from Last 3 Encounters:   18 103/68   18 91/57   18 91/59    Weight from Last 3 Encounters:   18 98 lb 6.4 oz (44.6 kg)   18 99 lb (44.9 kg)   18 98 lb (44.5 kg)                 Where to get your medicines      These medications were sent to [facility] - [place], [place] - [address], [place] [address]     Phone:  [phone]     gabapentin 300 MG capsule    hydrOXYzine 25 MG tablet    prazosin 1 MG capsule    prenatal multivitamin plus iron 27-0.8 MG Tabs per tablet    propranolol 10 MG tablet    sertraline 100 MG tablet          Primary Care Provider Office Phone # Fax #    Amanda Sharon Dimas [phone]       [facility]  [address] [place] [address]        Equal Access to Services     [facility] VALENTÍN : Hadii jasson bonilla hadasho Soomaali, waaxda luqadaha, qaybta kaalmada cruz nieto. So [facility] [phone].    ATENCIÓN: Si habla español, tiene a curtis disposición servicios gratuitos de asistencia lingüística. Llame al [phone].    We comply with applicable federal civil rights laws and [place] laws. We do not discriminate on the basis of race, color, national origin, age, disability, sex, sexual orientation, or gender identity.            Thank you!     " Thank you for choosing [facility]  for your care. Our goal is always to provide you with excellent care. Hearing back from our patients is one way we can continue to improve our services. Please take a few minutes to complete the written survey that you may receive in the mail after your visit with us. Thank you!             Your Updated Medication List - Protect others around you: Learn how to safely use, store and throw away your medicines at www.disposemymeds.org.          This list is accurate as of 6/26/18  4:27 PM.  Always use your most recent med list.                   Brand Name Dispense Instructions for use Diagnosis    etonogestrel 68 MG Impl    IMPLANON/NEXPLANON     1 each (68 mg) by Subdermal route continuous    Insertion of implantable subdermal contraceptive       gabapentin 300 MG capsule    NEURONTIN    90 capsule    Take 1 capsule (300 mg) by mouth At Bedtime    Severe major depressive disorder (H)       hydrOXYzine 25 MG tablet    ATARAX    90 tablet    Take 1 tablet (25 mg) by mouth nightly as needed for other (sleep)    Severe major depressive disorder (H), Insomnia, unspecified type       prazosin 1 MG capsule    MINIPRESS    90 capsule    Take 1 capsule (1 mg) by mouth At Bedtime    PTSD (post-traumatic stress disorder)       prenatal multivitamin plus iron 27-0.8 MG Tabs per tablet     100 tablet    Take 1 tablet by mouth daily    Gastroesophageal reflux disease without esophagitis       propranolol 10 MG tablet    INDERAL    120 tablet    Take 1 tablet (10 mg) by mouth 2 times daily as needed    Severe major depressive disorder (H)       sertraline 100 MG tablet    ZOLOFT    90 tablet    Take 1 tablet (100 mg) by mouth daily    Severe major depressive disorder (H)

## 2018-06-26 NOTE — PROGRESS NOTES
Preceptor Attestation:   Patient seen, evaluated and discussed with the resident. I have verified the content of the note, which accurately reflects my assessment of the patient and the plan of care.   Supervising Physician:  Samuel Arnett MD

## 2018-06-26 NOTE — NURSING NOTE
Due to patient being non-English speaking/uses sign language, an  was used for this visit. Only for face-to-face interpretation by an external agency, date and length of interpretation can be found on the scanned worksheet.     name: Martin Webster  Agency: [employer]  Language: Perri   Telephone number: [phone]  Type of interpretation: Face-to-face, spoken

## 2018-06-26 NOTE — PATIENT INSTRUCTIONS
-No changes to medications    -Take daily vitamin    Your new doctor is Dr. Sue Rogers        HOME CARE REFERRAL  Spoke with Tavia at [facility] who states they are accepting new patients, verified Agatha's contact information and faxed records to [phone]. They will contact patient to set up.

## 2018-06-27 ENCOUNTER — HOME CARE/HOSPICE - HEALTHEAST (OUTPATIENT)
Dept: HOME HEALTH SERVICES | Facility: HOME HEALTH | Age: 23
End: 2018-06-27

## 2018-06-27 ASSESSMENT — PATIENT HEALTH QUESTIONNAIRE - PHQ9: SUM OF ALL RESPONSES TO PHQ QUESTIONS 1-9: 13

## 2018-06-27 NOTE — PROGRESS NOTES
[address] [address]  [phone]         SUBJECTIVE       Agatha Rosales is a 22 year old  female with a PMH significant for:     Patient Active Problem List   Diagnosis     Language barrier, cultural differences     History of Helicobacter pylori infection     Bilateral low back pain without sciatica     Dermatitis     Severe major depressive disorder (H)     Other personal history of psychological trauma, not elsewhere classified     High level of expressed emotion within family     PTSD (post-traumatic stress disorder)     Nexplanon in place     She presents with mood follow up.    Mood had been stable but worsened last week because she is out of medications. She feels hydroxyzine helps with sleep. She manages her own medications and takes them out of the bottle but feels she forgets medications sometimes and would like a home RN to help with medication set up. In home therapy going well with no thoughts of harm to herself or her children. Her headaches and abdominal discomfort have resolved. No chest pain, SOB, palpitations, or paresthesias. Still some poor appetite but better and would like refill of vitamins.    Past Medical History:  Past Medical History:   Diagnosis Date     History of blood transfusion 2011       Past Surgical History:  Past Surgical History:   Procedure Laterality Date     NO HISTORY OF SURGERY         Family History:  Family History   Problem Relation Age of Onset     Diabetes No family hx of      Coronary Artery Disease No family hx of      Cancer No family hx of      Hypertension No family hx of      HEART DISEASE No family hx of        Social History:  Social History     Social History     Marital status:      Spouse name: N/A     Number of children: N/A     Years of education: N/A     Occupational History     student      Social History Main Topics     Smoking status: Never Smoker     Smokeless tobacco: Never Used     Alcohol use No     Drug  use: No     Sexual activity: Yes     Partners: Male     Other Topics Concern     Not on file     Social History Narrative       Medications:   Current Outpatient Prescriptions   Medication Sig Dispense Refill     etonogestrel (IMPLANON/NEXPLANON) 68 MG IMPL 1 each (68 mg) by Subdermal route continuous  0     gabapentin (NEURONTIN) 300 MG capsule Take 1 capsule (300 mg) by mouth At Bedtime 90 capsule 3     hydrOXYzine (ATARAX) 25 MG tablet Take 1 tablet (25 mg) by mouth nightly as needed for other (sleep) 90 tablet 3     prazosin (MINIPRESS) 1 MG capsule Take 1 capsule (1 mg) by mouth At Bedtime 90 capsule 3     Prenatal Vit-Fe Fumarate-FA (PRENATAL MULTIVITAMIN PLUS IRON) 27-0.8 MG TABS per tablet Take 1 tablet by mouth daily 100 tablet 3     propranolol (INDERAL) 10 MG tablet Take 1 tablet (10 mg) by mouth 2 times daily as needed 120 tablet 3     sertraline (ZOLOFT) 100 MG tablet Take 1 tablet (100 mg) by mouth daily 90 tablet 3     [DISCONTINUED] gabapentin (NEURONTIN) 300 MG capsule Take 1 capsule (300 mg) by mouth At Bedtime (Patient not taking: Reported on 6/26/2018) 90 capsule 3     [DISCONTINUED] prazosin (MINIPRESS) 1 MG capsule Take 1 capsule (1 mg) by mouth At Bedtime (Patient not taking: Reported on 6/26/2018) 90 capsule 3     [DISCONTINUED] propranolol (INDERAL) 10 MG tablet Take 1 tablet (10 mg) by mouth 2 times daily as needed (Patient not taking: Reported on 6/26/2018) 120 tablet 3     [DISCONTINUED] sertraline (ZOLOFT) 100 MG tablet Take 1 tablet (100 mg) by mouth daily (Patient not taking: Reported on 6/26/2018) 90 tablet 3       Allergies:   No Known Allergies    PMH, Surgical Hx, Family Hx, Social Hx, Medications and Allergies were reviewed and updated as needed in Epic.        REVIEW OF SYSTEMS     Complete ROS negative except as above        OBJECTIVE     Vitals:    06/26/18 1611   BP: 103/68   Pulse: 64   Temp: 98.2  F (36.8  C)   TempSrc: Oral   SpO2: 100%   Weight: 98 lb 6.4 oz (44.6 kg)  "  Height: 4' 10.6\" (148.8 cm)     Body mass index is 20.15 kg/(m^2).    Constitutional: Awake, alert, cooperative, young Perri female in no apparent distress, and appears stated age. Seen with her young daughter and a professional Perri . Interacts appropriately with daughter   Lungs: No increased work of breathing, good air exchange, clear to auscultation bilaterally, no crackles or wheezing.  Cardiovascular: Regular rate and rhythm, normal S1 and S2, no S3 or S4, and no murmur noted.   Abdomen: Soft, non-tender  Neurologic: Awake, alert, oriented to name, place and time.    Neuropsychiatric: Flat affect. Sad to normal mood. Fair orientation, memory and insight.    PHQ-9 SCORE 4/20/2018 5/22/2018 6/26/2018   Total Score 19 12 13       ASSESSMENT AND PLAN     Agatha Rosales is a 22 year old Perri female with a PMH significant for nexplanon, MDD, and anxiety in place who presents for mood follow up.    PTSD (post-traumatic stress disorder), Anxiety, and MDD: Contracts for safety. Sleep improved with hydroxyzine. Some poor appetite but no abdominal pain. Will continue hydroxyzine, gabapentin, sertraline, prazosin, and prn propranolol without change as patient is comfortable at these doses.  -New rx sent for hydrozyzine as she has not received it yet  -Continue in home therapy with [facility]  -Continue gabapentin, sertraline, prazosin, prn hydroxyzine, and prn propranolol at same doses  -Refilled vitamins  -Referral placed for home RN for medication set up    RTC in 2 months for follow up of mood and abdominal pain or sooner if develops new or worsening symptoms.    Options for treatment and/or follow-up care were reviewed with the patient who was engaged and actively involved in the decision making process and verbalized understanding of the options discussed and was satisfied with the final plan.    Prema Trotter MD PGY-3  [facility]  Pager: [phone]    Patient discussed with  " Samuel Arnett, attending physician, who agrees with the plan.

## 2018-06-29 ENCOUNTER — MEDICAL CORRESPONDENCE (OUTPATIENT)
Dept: HEALTH INFORMATION MANAGEMENT | Facility: CLINIC | Age: 23
End: 2018-06-29

## 2018-06-29 ENCOUNTER — TELEPHONE (OUTPATIENT)
Dept: FAMILY MEDICINE | Facility: CLINIC | Age: 23
End: 2018-06-29

## 2018-06-29 ENCOUNTER — HOME CARE/HOSPICE - HEALTHEAST (OUTPATIENT)
Dept: HOME HEALTH SERVICES | Facility: HOME HEALTH | Age: 23
End: 2018-06-29

## 2018-06-29 NOTE — TELEPHONE ENCOUNTER
[facility] Family Medicine phone call message- general phone call:    Reason for call: She is going out to see the patient in a little and she has some questions re the patients medication, she was sent two visits notes in the a month a part but there are different meds on both notes she needs a comprehensive list(just a med list no visit notes).fax number for her     Return call needed: Yes    OK to leave a message on voice mail? Yes    Primary language: Perri      needed? Yes    Call taken on June 29, 2018 at 9:11 AM by Thomas Rothman

## 2018-06-29 NOTE — TELEPHONE ENCOUNTER
Pt states that she will open pt for home care today.  She states that she was reading the last 2 notes and just wanted to be clear on the current med list.  Told her that she should go by the 6/26/28 med list as this is the most current.  Reviewed list with Verónica and she had no further questions.  She states that she will call this afternoon when she is at the home if she needs any further clarifications./NG

## 2018-06-29 NOTE — TELEPHONE ENCOUNTER
[email or web address] Medicine phone call message- general phone call:    Reason for call: She would like a call back with home care orders.    Return call needed: Yes    OK to leave a message on voice mail? Yes    Primary language: Perri      needed? Yes    Call taken on June 29, 2018 at 3:51 PM by Thomas Rothman

## 2018-07-02 NOTE — TELEPHONE ENCOUNTER
Verónica GARCIA, calling   Pt has not taken any meds for a month. Fyi. She is breastfeeding her youngest child. Are the meds ordered safe for breastfeeding. Her therapist said pt is interested in getting pregnant again. Does she still have birth control? Per chart, it looks like she does (nexplanon)  Faxed med list to [facility] to make sure we are all on the same page.     Routed to Dr. Dimas PCP. /MAKENNA Frank

## 2018-07-02 NOTE — TELEPHONE ENCOUNTER
Pt will be seen again on Thursday. HHN would like to know before Thursday regarding the questions she asked.     /MAKENNA Frank

## 2018-07-03 ENCOUNTER — HOME CARE/HOSPICE - HEALTHEAST (OUTPATIENT)
Dept: HOME HEALTH SERVICES | Facility: HOME HEALTH | Age: 23
End: 2018-07-03

## 2018-07-05 ENCOUNTER — HOME CARE/HOSPICE - HEALTHEAST (OUTPATIENT)
Dept: HOME HEALTH SERVICES | Facility: HOME HEALTH | Age: 23
End: 2018-07-05

## 2018-07-05 NOTE — TELEPHONE ENCOUNTER
Phone call to HHN regarding information below and she would like to know if the patient could restart any of her medications as she has has not taken them for 1 month. She has already did her start of care and the primary purpose for the skilled nursing is for medication set up. Verónica states that another HHN went out to see the patient in her home and she is not sure if she set up her medication during her visit. If they can't set up her medications they will have to put her services on hold. Phone call to  to arrange an appt but mailbox was full and was unable to leave a message. Please advise. /MAKENNA Maguire  Routed to Dr. Hanley

## 2018-07-05 NOTE — TELEPHONE ENCOUNTER
Could you call N regarding the following?     She should be seen before any medication changes. The hydroxyzine has been addressed by Dr. Lashon Barrett in the past and she was counseled to avoid breastfeeding for several hours after and monitor for signs of infant drowsiness. Please have patient make an appointment in the next couple weeks. It appears she has most regularly seen Dr. Lashon Barrett who is now gone - would recommend she make an appointment with myself or possibly one of the new PGY-2s as it looks like she would benefit from seeing the same provider regularly for the next 1-2 years if possible.     Roxana Hanley

## 2018-07-05 NOTE — TELEPHONE ENCOUNTER
If she hasn't taken them in a month, she should be seen before restarting them. The hydroxyzine is OK to continue as long as she takes it at least 3 hours before breastfeeding and monitors for signs of sedation in the infant. Pt should be seen ASAP. Can HHN encourage her to make an appt if we can't get a hold of patient? KH

## 2018-07-08 ENCOUNTER — HOME CARE/HOSPICE - HEALTHEAST (OUTPATIENT)
Dept: HOME HEALTH SERVICES | Facility: HOME HEALTH | Age: 23
End: 2018-07-08

## 2018-07-13 ENCOUNTER — HOME CARE/HOSPICE - HEALTHEAST (OUTPATIENT)
Dept: HOME HEALTH SERVICES | Facility: HOME HEALTH | Age: 23
End: 2018-07-13

## 2018-07-14 ENCOUNTER — TRANSFERRED RECORDS (OUTPATIENT)
Dept: HEALTH INFORMATION MANAGEMENT | Facility: CLINIC | Age: 23
End: 2018-07-14

## 2018-07-16 ENCOUNTER — TELEPHONE (OUTPATIENT)
Dept: FAMILY MEDICINE | Facility: CLINIC | Age: 23
End: 2018-07-16

## 2018-07-16 NOTE — TELEPHONE ENCOUNTER
[facility] Family Medicine phone call message- general phone call:    Reason for call: The patient was discharged from [facility] with out home care.Sandy would like to get orders for home care.    Return call needed: Yes    OK to leave a message on voice mail? Yes    Primary language: Perri      needed? Yes    Call taken on July 16, 2018 at 3:20 PM by Thomas Rothman

## 2018-07-16 NOTE — TELEPHONE ENCOUNTER
Pt was recently discharged from [facility] for back pain. Has f/u appt tomorrow.   Gave verbal okay to restart home care.     Routed to Dr. Dimas PCP. /MAKENNA Frank

## 2018-07-17 ENCOUNTER — MEDICAL CORRESPONDENCE (OUTPATIENT)
Dept: HEALTH INFORMATION MANAGEMENT | Facility: CLINIC | Age: 23
End: 2018-07-17

## 2018-07-17 ENCOUNTER — HOME CARE/HOSPICE - HEALTHEAST (OUTPATIENT)
Dept: HOME HEALTH SERVICES | Facility: HOME HEALTH | Age: 23
End: 2018-07-17

## 2018-07-17 ENCOUNTER — TELEPHONE (OUTPATIENT)
Dept: FAMILY MEDICINE | Facility: CLINIC | Age: 23
End: 2018-07-17

## 2018-07-17 ENCOUNTER — OFFICE VISIT (OUTPATIENT)
Dept: FAMILY MEDICINE | Facility: CLINIC | Age: 23
End: 2018-07-17
Payer: COMMERCIAL

## 2018-07-17 VITALS
BODY MASS INDEX: 19.65 KG/M2 | HEART RATE: 80 BPM | WEIGHT: 93.6 LBS | TEMPERATURE: 98.3 F | SYSTOLIC BLOOD PRESSURE: 94 MMHG | RESPIRATION RATE: 16 BRPM | HEIGHT: 58 IN | DIASTOLIC BLOOD PRESSURE: 65 MMHG | OXYGEN SATURATION: 100 %

## 2018-07-17 DIAGNOSIS — K50.819 CROHN'S DISEASE OF SMALL AND LARGE INTESTINES WITH COMPLICATION (H): Primary | ICD-10-CM

## 2018-07-17 DIAGNOSIS — N20.0 KIDNEY STONE: ICD-10-CM

## 2018-07-17 NOTE — PATIENT INSTRUCTIONS
UROLOGY ADULT REFERRAL  [facility]  [address] [place] [address]    Appointment:  Tuesday July 24, 2018  Arrival Time:  9:15 am  Provider: Shannon Kumar Jr, MD    Please bring a copy of your insurance card, photo ID and medications including any over the counter medications you use as needed    A Perri  will be requested for your appointment    If you are unable to make this appointment please contact the clinic at  [phone] to reschedule    Medical Ride through [employer]     time is 8:30 am by [employer]     GASTROENTEROLOGY ADULT REF CONSULT ONLY  [facility] [phone]   Unable to schedule as the clinic is closed at this time (5:08 pm)  Advised will call to schedule in the morning - preferably for a Wednesday appointment per Agatha's request - and contact her through a Perri  with all referral and ride details.    [facility]  [address] [place] [address]    Patient is already scheduled for a Hospital Follow Up appointment per  [facility] on:    Appointment:  Monday August 13, 2018  Arrival Time: 11:20 am  Provider:  Ryann Meneses NP-[facility]    If you cannot make this appointment please contact the clinic at [phone] to reschedule    A Perri  will be requested for your appointment    Unable to schedule Medical Ride with [employer] until 6/27/2018 - will follow up to schedule when available    Called [employer] for a Perri  ID #[id number] - Agatha is aware - stated her  will be home with their children on 7/24/18 so she does not need a car seat. She will ask her  about the appointment on 8/13/18 to see if he will be able to be home with the children and if not she will contact us to reschedule.    July 30, 2018 at 10:02 am   [facility] scheduled by Rossy with [employer] for 8/13/18 at 10:20 am [employer]  [phone]    Called [employer] for a Perri  ID #[id number] - [facility] called and moved her appointment up to 7/31/18 - cancelled ride for 8/13/18

## 2018-07-17 NOTE — NURSING NOTE
Due to patient being non-English speaking/uses sign language, an  was used for this visit. Only for face-to-face interpretation by an external agency, date and length of interpretation can be found on the scanned worksheet.     name: Nidia  Agency: [employer]  Language: Perri   Telephone number: [phone]  Type of interpretation: Face-to-face, spoken     name: Nidia  Language: Perri  Agency: [employer]  Phone number: [phone]

## 2018-07-17 NOTE — PROGRESS NOTES
"Agatha Rosales comes in today to f/u from [place].  7/12-7/15.  She was admitted with abdominal pain and she had a CT scan which suggested colitis and she also was found to have hydronephrosis.  She was seen by GI and they did a colonoscopy showing ileocolitis.  They are treating empirically with antibiotics well waiting for the pathology report.    She is not having diarrhea at this time but is having 3-4 soft stools per day.  No blood in the stool.  She does not have mucous in the stool.    Final Pathologic Diagnosis   A. Terminal ileum, biopsy-- Chronic active ileitis with mild activity   B. Colon, right, biopsy-- Mildly active colitis with no chronic   architectural changes   C. Colon, left, biopsy-- Patchy mildly active colitis with early   architectural changes     Allergies, medications and problem list reviewed and updated as needed in [facility].      REVIEW OF SYSTEMS    General: No fevers  Head: No headache  Neck: No swallowing problems   CV: No chest pain or palpitations  Resp: No shortness of breath.  No cough. No hemoptysis.  GI: No constipation, or diarrhea.  No nausea or vomiting  : No urinary c/o    BP 94/65  Pulse 80  Temp 98.3  F (36.8  C) (Oral)  Resp 16  Ht 4' 10.31\" (148.1 cm)  Wt 93 lb 9.6 oz (42.5 kg)  LMP 06/17/2018 (Approximate)  SpO2 100%  BMI 19.36 kg/m2      Gen:  Well nourished and in NAD  HEENT: PERRLA; TMs normal color and landmarks; nasopharynx pink and moist; oropharynx pink and moist  Neck: supple without lymphadenopathy  CV:  RRR  - no murmurs, rubs, or gallups,   Pulm:  CTAB, no wheezes/rales/rhonchi, good air entry   ABD: soft, nontender, no masses, no rebound, BS intact throughout  Extrem: no cyanosis, edema or clubbing  Psych: Euthymic     ASSESSMENT and PLAN:  1. Crohn's disease of small and large intestines with complication (H)  It seems that the colonoscopic evaluation in the biopsy reports are most consistent with Crohn's disease.  She will need to follow-up with GI.  " Unfortunately she is not able to do that as she does not speak English and does not have an English speaker in her home.  We will help her arrange that and hopefully get further records as she gets a follow-up with her specialists.  - GASTROENTEROLOGY ADULT REF CONSULT ONLY  -  : Sign Language or Oral - 53-67 minutes    2. Kidney stone  Again, will help schedule her follow-up.  - UROLOGY ADULT REFERRAL; Future    Total of 45 minutes was spent in face to face contact with patient with > 50% in chart review and coordination of care.  Options for treatment and/or follow-up care were reviewed with the patient/family who was engaged and actively involved in the decision making process and who verbalized understanding of the options discussed and was satisfied with the final plan.    RTC in 3-4 weeks for follow up of colitis or sooner if develops new or worsening symptoms.    Ramesh Randall

## 2018-07-17 NOTE — TELEPHONE ENCOUNTER
[facility] phone call message- general phone call:    Reason for call: Skilled nursing orders weekly times 6 weeks plus 3 prn's,    Return call needed: Yes    OK to leave a message on voice mail? Yes    Primary language: Perri      needed? Yes    Call taken on July 17, 2018 at 9:01 AM by TAY Conklin

## 2018-07-17 NOTE — TELEPHONE ENCOUNTER
"Gave verbal okay for skilled nursing orders weekly times 6 weeks plus 3 prn's.    Pt was DC'd from the hospital ([facility])-famotidine was prescribed but not on our med list. Should she take this?    Pt will bring hospital records to her visit today w/ Dr. Randall.    Inderal is prescribed to this pt, but prescribed as \"prn\" is that true?     N would like a call back after the visit so she can set up meds appropriately.     Routed to DR. Randall (seeing pt today). /MAKENNA Frank    "

## 2018-07-17 NOTE — MR AVS SNAPSHOT
After Visit Summary   7/17/2018    Agatha Rosales    MRN: [id number]           Patient Information     Date Of Birth          1995        Visit Information        Provider Department      7/17/2018 4:10 PM Ramesh Randall MD [facility]        Today's Diagnoses     Crohn's disease of small and large intestines with complication (H)    -  1    Kidney stone          Care Instructions    UROLOGY ADULT REFERRAL  [facility] [address] [place] [address]    Appointment:  Tuesday July 24, 2018  Arrival Time:  9:15 am  Provider: Shannon Kumar Jr, MD    Please bring a copy of your insurance card, photo ID and medications including any over the counter medications you use as needed    A Perri  will be requested for your appointment    If you are unable to make this appointment please contact the clinic at  [phone] to reschedule    Medical Ride through [employer]     time is 8:30 am by [employer]     GASTROENTEROLOGY ADULT REF CONSULT ONLY  [facility] [phone]   Unable to schedule as the clinic is closed at this time (5:08 pm)  Advised will call to schedule in the morning - preferably for a Wednesday appointment per Agatha's request - and contact her through a Perri  with all referral and ride details.    [facility]  [address] [place] [address]    Patient is already scheduled for a Hospital Follow Up appointment per  [facility] on:    Appointment:  Monday August 13, 2018  Arrival Time: 11:20 am  Provider:  JOVANA Stephens    If you cannot make this appointment please contact the clinic at [phone] to reschedule    A Perri  will be requested for your appointment    Unable to schedule Medical Ride with [employer] until 6/27/2018 - will follow up to schedule when available    Called [employer] for a Perri  ID  #[id number] - Agatha is aware - stated her  will be home with their children on 18 so she does not need a car seat. She will ask her  about the appointment on 18 to see if he will be able to be home with the children and if not she will contact us to reschedule. I will follow up with her regarding this once the schedule for [employer] [facility] opens on 18.          Follow-ups after your visit        Additional Services     GASTROENTEROLOGY ADULT REF CONSULT ONLY       F/U with [facility].  Seen in [facility].            UROLOGY ADULT REFERRAL       [facility]  Seen in [place].                  Your next 10 appointments already scheduled     Aug 16, 2018  3:50 PM CDT   Return Visit with Ramesh Randall MD   [facility]    [address] [place] [address]   [phone]              Who to contact     Please call your clinic at [phone] to:    Ask questions about your health    Make or cancel appointments    Discuss your medicines    Learn about your test results    Speak to your doctor            Additional Information About Your Visit        [employer] Information     [employer] is an electronic gateway that provides easy, online access to your medical records. With [employer], you can request a clinic appointment, read your test results, renew a prescription or communicate with your care team.     To sign up for [employer] visit the website at [email or web address]   You will be asked to enter the access code listed below, as well as some personal information. Please follow the directions to create your username and password.     Your access code is: [email or web address]  Expires: 10/18/2018  2:55 PM     Your access code will  in 90 days. If you need help or a new code, please contact your [facility] or call [phone] for assistance.        Care EveryWhere ID     This is your Care EveryWhere ID. This  "could be used by other organizations to access your [facility] medical records  [id number]        Your Vitals Were     Pulse Temperature Respirations Height Last Period Pulse Oximetry    80 98.3  F (36.8  C) (Oral) 16 4' 10.31\" (148.1 cm) 06/17/2018 (Approximate) 100%    BMI (Body Mass Index)                   19.36 kg/m2            Blood Pressure from Last 3 Encounters:   07/17/18 94/65   06/26/18 103/68   05/22/18 91/57    Weight from Last 3 Encounters:   07/17/18 93 lb 9.6 oz (42.5 kg)   06/26/18 98 lb 6.4 oz (44.6 kg)   05/22/18 99 lb (44.9 kg)              We Performed the Following     GASTROENTEROLOGY ADULT REF CONSULT ONLY      : Sign Language or Oral - 53-67 minutes        Primary Care Provider Office Phone # Fax #    Amanda Sharon Dimas [phone]       [address] [place] [address]        Equal Access to Services     CORAZON LAURA AH: Hadii aad ku hadasho Soomaali, waaxda luqadaha, qaybta kaalmada adeegyada, waxay idiin haytorri miranda . So [facility] [phone].    ATENCIÓN: Si habla español, tiene a curtis disposición servicios gratuitos de asistencia lingüística. [facility] [phone].    We comply with applicable federal civil rights laws and [place] laws. We do not discriminate on the basis of race, color, national origin, age, disability, sex, sexual orientation, or gender identity.            Thank you!     Thank you for choosing [facility]  for your care. Our goal is always to provide you with excellent care. Hearing back from our patients is one way we can continue to improve our services. Please take a few minutes to complete the written survey that you may receive in the mail after your visit with us. Thank you!             Your Updated Medication List - Protect others around you: Learn how to safely use, store and throw away your medicines at www.disposemymeds.org.          This list is accurate as of 7/17/18 11:59 PM.  Always use your " most recent med list.                   Brand Name Dispense Instructions for use Diagnosis    etonogestrel 68 MG Impl    IMPLANON/NEXPLANON     1 each (68 mg) by Subdermal route continuous    Insertion of implantable subdermal contraceptive       gabapentin 300 MG capsule    NEURONTIN    90 capsule    Take 1 capsule (300 mg) by mouth At Bedtime    Severe major depressive disorder (H)       hydrOXYzine 25 MG tablet    ATARAX    90 tablet    Take 1 tablet (25 mg) by mouth nightly as needed for other (sleep)    Severe major depressive disorder (H), Insomnia, unspecified type       prazosin 1 MG capsule    MINIPRESS    90 capsule    Take 1 capsule (1 mg) by mouth At Bedtime    PTSD (post-traumatic stress disorder)       prenatal multivitamin plus iron 27-0.8 MG Tabs per tablet     100 tablet    Take 1 tablet by mouth daily    Gastroesophageal reflux disease without esophagitis       propranolol 10 MG tablet    INDERAL    120 tablet    Take 1 tablet (10 mg) by mouth 2 times daily as needed    Severe major depressive disorder (H)       sertraline 100 MG tablet    ZOLOFT    90 tablet    Take 1 tablet (100 mg) by mouth daily    Severe major depressive disorder (H)

## 2018-07-19 ENCOUNTER — HOME CARE/HOSPICE - HEALTHEAST (OUTPATIENT)
Dept: HOME HEALTH SERVICES | Facility: HOME HEALTH | Age: 23
End: 2018-07-19

## 2018-07-19 ENCOUNTER — TELEPHONE (OUTPATIENT)
Dept: FAMILY MEDICINE | Facility: CLINIC | Age: 23
End: 2018-07-19

## 2018-07-19 NOTE — TELEPHONE ENCOUNTER
[facility] Family Medicine phone call message- general phone call:    Reason for call: She needs to know if there is any changes or and f/u that need to be done from her appointment on the 17th.    Return call needed: Yes    OK to leave a message on voice mail? Yes      Primary language: Perri      needed? Yes    Call taken on July 19, 2018 at 2:14 PM by Thomas Rothman

## 2018-07-20 NOTE — TELEPHONE ENCOUNTER
Dr. Randall--[facility] would like to know if you address her questions at the last visit.    Also, would like us to fax last visit summary to [phone] Asha Delgado.    Routed to Dr. Randall. /MAKENNA Frank

## 2018-07-26 ENCOUNTER — HOME CARE/HOSPICE - HEALTHEAST (OUTPATIENT)
Dept: HOME HEALTH SERVICES | Facility: HOME HEALTH | Age: 23
End: 2018-07-26

## 2018-08-02 ENCOUNTER — HOME CARE/HOSPICE - HEALTHEAST (OUTPATIENT)
Dept: HOME HEALTH SERVICES | Facility: HOME HEALTH | Age: 23
End: 2018-08-02

## 2018-08-10 ENCOUNTER — HOME CARE/HOSPICE - HEALTHEAST (OUTPATIENT)
Dept: HOME HEALTH SERVICES | Facility: HOME HEALTH | Age: 23
End: 2018-08-10

## 2018-08-13 ENCOUNTER — MEDICAL CORRESPONDENCE (OUTPATIENT)
Dept: HEALTH INFORMATION MANAGEMENT | Facility: CLINIC | Age: 23
End: 2018-08-13

## 2018-08-15 ENCOUNTER — HOME CARE/HOSPICE - HEALTHEAST (OUTPATIENT)
Dept: HOME HEALTH SERVICES | Facility: HOME HEALTH | Age: 23
End: 2018-08-15

## 2018-08-15 ENCOUNTER — MEDICAL CORRESPONDENCE (OUTPATIENT)
Dept: HEALTH INFORMATION MANAGEMENT | Facility: CLINIC | Age: 23
End: 2018-08-15

## 2018-08-23 ENCOUNTER — HOME CARE/HOSPICE - HEALTHEAST (OUTPATIENT)
Dept: HOME HEALTH SERVICES | Facility: HOME HEALTH | Age: 23
End: 2018-08-23

## 2018-08-23 ENCOUNTER — TELEPHONE (OUTPATIENT)
Dept: FAMILY MEDICINE | Facility: CLINIC | Age: 23
End: 2018-08-23

## 2018-08-23 NOTE — TELEPHONE ENCOUNTER
[facility] Family Medicine phone call message- general phone call:    Reason for call: Orders for skilled nursing every other week x 10 weeks plus 3 PRNS.    Return call needed: Yes    OK to leave a message on voice mail? Yes    Primary language: Perri      needed? Yes    Call taken on August 23, 2018 at 10:12 AM by Yodit Mancini

## 2018-08-31 ENCOUNTER — HOME CARE/HOSPICE - HEALTHEAST (OUTPATIENT)
Dept: HOME HEALTH SERVICES | Facility: HOME HEALTH | Age: 23
End: 2018-08-31

## 2018-09-14 ENCOUNTER — HOME CARE/HOSPICE - HEALTHEAST (OUTPATIENT)
Dept: HOME HEALTH SERVICES | Facility: HOME HEALTH | Age: 23
End: 2018-09-14

## 2018-09-14 ENCOUNTER — MEDICAL CORRESPONDENCE (OUTPATIENT)
Dept: HEALTH INFORMATION MANAGEMENT | Facility: CLINIC | Age: 23
End: 2018-09-14

## 2018-09-20 ENCOUNTER — TELEPHONE (OUTPATIENT)
Dept: FAMILY MEDICINE | Facility: CLINIC | Age: 23
End: 2018-09-20

## 2018-09-20 NOTE — TELEPHONE ENCOUNTER
Called JOSE Delgado--she will find who increased pt's sertraline to 150 mg daily. It looks like a provider at  had made this change. She is not sure if pt has switched clinics. Sandy will call us back with update (she is visiting the pt again next week).    /MAKENNA Frank

## 2018-09-28 ENCOUNTER — TELEPHONE (OUTPATIENT)
Dept: FAMILY MEDICINE | Facility: CLINIC | Age: 23
End: 2018-09-28

## 2018-09-28 ENCOUNTER — HOME CARE/HOSPICE - HEALTHEAST (OUTPATIENT)
Dept: HOME HEALTH SERVICES | Facility: HOME HEALTH | Age: 23
End: 2018-09-28

## 2018-09-30 ENCOUNTER — HOME CARE/HOSPICE - HEALTHEAST (OUTPATIENT)
Dept: HOME HEALTH SERVICES | Facility: HOME HEALTH | Age: 23
End: 2018-09-30

## 2018-10-12 ENCOUNTER — MEDICAL CORRESPONDENCE (OUTPATIENT)
Dept: HEALTH INFORMATION MANAGEMENT | Facility: CLINIC | Age: 23
End: 2018-10-12

## 2018-10-12 ENCOUNTER — HOME CARE/HOSPICE - HEALTHEAST (OUTPATIENT)
Dept: HOME HEALTH SERVICES | Facility: HOME HEALTH | Age: 23
End: 2018-10-12

## 2018-10-20 ENCOUNTER — HOME CARE/HOSPICE - HEALTHEAST (OUTPATIENT)
Dept: HOME HEALTH SERVICES | Facility: HOME HEALTH | Age: 23
End: 2018-10-20

## 2018-10-22 ENCOUNTER — MEDICAL CORRESPONDENCE (OUTPATIENT)
Dept: HEALTH INFORMATION MANAGEMENT | Facility: CLINIC | Age: 23
End: 2018-10-22

## 2018-10-26 ENCOUNTER — HOME CARE/HOSPICE - HEALTHEAST (OUTPATIENT)
Dept: HOME HEALTH SERVICES | Facility: HOME HEALTH | Age: 23
End: 2018-10-26

## 2018-10-26 ENCOUNTER — MEDICAL CORRESPONDENCE (OUTPATIENT)
Dept: HEALTH INFORMATION MANAGEMENT | Facility: CLINIC | Age: 23
End: 2018-10-26

## 2018-10-29 ENCOUNTER — MEDICAL CORRESPONDENCE (OUTPATIENT)
Dept: HEALTH INFORMATION MANAGEMENT | Facility: CLINIC | Age: 23
End: 2018-10-29

## 2018-10-31 ENCOUNTER — MEDICAL CORRESPONDENCE (OUTPATIENT)
Dept: HEALTH INFORMATION MANAGEMENT | Facility: CLINIC | Age: 23
End: 2018-10-31

## 2021-05-30 VITALS — HEIGHT: 58 IN | WEIGHT: 98 LBS | BODY MASS INDEX: 20.57 KG/M2

## 2021-06-08 NOTE — PROGRESS NOTES
"S:  Patient seen in clinic today for green card exam and update of immunizations.  There is no past history of immunization reactions.  No recent fevers or shortness of breath.     Patient Active Problem List   Diagnosis     H. pylori infection       O:    Visit Vitals     BP 90/62 (Patient Site: Left Arm, Patient Position: Sitting, Cuff Size: Adult Regular)     Pulse 80     Temp 97.9  F (36.6  C) (Oral)     Resp 20     Ht 4' 10.25\" (1.48 m)     Wt (!) 98 lb (44.5 kg)     LMP 01/07/2017     Breastfeeding Yes     BMI 20.31 kg/m2     General - alert, NAD  CV - RRR  Resp - lunds clear to auscultation    A/P:  Green Card/update imm.  Counseling done on immunization history and requirements with the patient.  Reviewed available immunization history and relevant lab records with patient and family.  Immunizations given as documented in the EHR and on form.  Acetaminophen as needed for post-immunization fever or myalgias.  [employer] form I-693 completed today with the patient.  Given to patient in a sealed labeled envelope and a copy is being scanned into the EHR.  Please see that form for further details.  Patient directed to follow-up in clinic for routine and preventative care.      "

## 2025-02-03 NOTE — NURSING NOTE
name: Paresh Chavez  Language: Perri  Agency: [facility]  Phone number: [phone]       No